# Patient Record
Sex: MALE | Race: WHITE | NOT HISPANIC OR LATINO | Employment: OTHER | URBAN - NONMETROPOLITAN AREA
[De-identification: names, ages, dates, MRNs, and addresses within clinical notes are randomized per-mention and may not be internally consistent; named-entity substitution may affect disease eponyms.]

---

## 2019-03-08 ENCOUNTER — TRANSCRIBE ORDERS (OUTPATIENT)
Dept: GENERAL RADIOLOGY | Facility: CLINIC | Age: 69
End: 2019-03-08

## 2019-03-08 DIAGNOSIS — M25.511 RIGHT SHOULDER PAIN, UNSPECIFIED CHRONICITY: Primary | ICD-10-CM

## 2019-05-16 ENCOUNTER — OFFICE VISIT (OUTPATIENT)
Dept: ORTHOPEDIC SURGERY | Facility: CLINIC | Age: 69
End: 2019-05-16

## 2019-05-16 VITALS — BODY MASS INDEX: 22.2 KG/M2 | WEIGHT: 155.1 LBS | HEIGHT: 70 IN

## 2019-05-16 DIAGNOSIS — M25.511 RIGHT SHOULDER PAIN, UNSPECIFIED CHRONICITY: Primary | ICD-10-CM

## 2019-05-16 DIAGNOSIS — M75.101 ROTATOR CUFF SYNDROME, RIGHT: ICD-10-CM

## 2019-05-16 DIAGNOSIS — F17.210 HEAVY CIGARETTE SMOKER (20-39 PER DAY): ICD-10-CM

## 2019-05-16 DIAGNOSIS — E11.9 TYPE 2 DIABETES MELLITUS WITHOUT COMPLICATION, WITHOUT LONG-TERM CURRENT USE OF INSULIN (HCC): ICD-10-CM

## 2019-05-16 DIAGNOSIS — M75.41 IMPINGEMENT SYNDROME, SHOULDER, RIGHT: ICD-10-CM

## 2019-05-16 PROCEDURE — 99204 OFFICE O/P NEW MOD 45 MIN: CPT | Performed by: ORTHOPAEDIC SURGERY

## 2019-05-16 PROCEDURE — 99406 BEHAV CHNG SMOKING 3-10 MIN: CPT | Performed by: ORTHOPAEDIC SURGERY

## 2019-05-16 NOTE — PROGRESS NOTES
Jamison Mccarthy is a 68 y.o. male   Primary provider:  Bryce Waterman APRN       Chief Complaint   Patient presents with   • Right Shoulder - Pain       HISTORY OF PRESENT ILLNESS: Patient presents today for Right Shoulder pain after slipping on ice and landing on Right Shoulder. He states that is pain is constant and rates it a 3. Bryce Waterman referred Mr. Mccarthy for PT after having X-rays performed on 3/8/19 after his fall . He has been doing PT at [facility] for the past 3 weeks.     Pain in right shoulder since march after a fall.  Has negative xrays  Has done 4 weeks of PT with HEP  Constant dull aches, some sharp pains with certain activity  Pain at night wakes him up  No numbness or tingling      Upper Extremity Issue   This is a new problem. The current episode started more than 1 month ago. The problem occurs constantly. The problem has been gradually worsening. Associated symptoms include joint swelling. The symptoms are aggravated by bending and twisting. He has tried ice, NSAIDs and acetaminophen for the symptoms. The treatment provided moderate relief.        CONCURRENT MEDICAL HISTORY:    Past Medical History:   Diagnosis Date   • Diabetes (CMS/HCC)    • Heart disease    • Hypertension        No Known Allergies      Current Outpatient Medications:   •  metFORMIN (GLUCOPHAGE) 1000 MG tablet, , Disp: , Rfl: 0    Past Surgical History:   Procedure Laterality Date   • CORONARY ARTERY BYPASS GRAFT  2007   • KNEE SURGERY Left        History reviewed. No pertinent family history.     Social History     Socioeconomic History   • Marital status:      Spouse name: Not on file   • Number of children: Not on file   • Years of education: Not on file   • Highest education level: Not on file   Tobacco Use   • Smoking status: Current Every Day Smoker     Types: Cigarettes   • Smokeless tobacco: Never Used   Substance and Sexual Activity   • Alcohol use: No     Frequency: Never   • Drug use: No     "    Review of Systems   Constitutional: Negative.    HENT: Negative.    Eyes: Negative.    Respiratory: Negative.    Cardiovascular: Negative.    Gastrointestinal: Negative.    Endocrine: Negative.    Genitourinary: Negative.    Musculoskeletal: Positive for joint swelling.   Skin: Negative.    Allergic/Immunologic: Negative.    Neurological: Negative.    Psychiatric/Behavioral: Negative.        PHYSICAL EXAMINATION:       Ht 177.8 cm (70\")   Wt 70.4 kg (155 lb 1.6 oz)   BMI 22.25 kg/m²     Physical Exam   Constitutional: He is oriented to person, place, and time. He appears well-developed and well-nourished. No distress.   Eyes: Conjunctivae are normal. Pupils are equal, round, and reactive to light.   Neck: Neck supple. No tracheal deviation present. No thyromegaly present.   Cardiovascular: Normal rate and intact distal pulses.   Pulmonary/Chest: Effort normal. He exhibits no tenderness.   Abdominal: Soft. He exhibits no distension and no mass. There is no tenderness.   Neurological: He is alert and oriented to person, place, and time.   Skin: Skin is warm. No rash noted.   Psychiatric: He has a normal mood and affect. His behavior is normal. Judgment and thought content normal.       GAIT:     [x]  Normal  []  Antalgic    Assistive device: [x]  None  []  Walker     []  Crutches  []  Cane     []  Wheelchair  []  Stretcher    Right Shoulder Exam     Tenderness   The patient is experiencing tenderness in the acromioclavicular joint and acromion.    Range of Motion   Active abduction: 140   Forward flexion: 150     Muscle Strength   Abduction: 4/5   Supraspinatus: 4/5     Tests   Dominique test: positive  Cross arm: positive  Impingement: positive    Other   Erythema: absent  Sensation: normal  Pulse: present      Left Shoulder Exam     Tenderness   The patient is experiencing no tenderness.     Range of Motion   The patient has normal left shoulder ROM.    Muscle Strength   The patient has normal left shoulder " strength.    Other   Erythema: absent  Sensation: normal  Pulse: present                 Three view right shoulder     HISTORY: Right shoulder pain after slipping on ice and landed on  right shoulder three days ago.     AP films with the humerus in internal and external rotation and  scapular Y view were obtained.     COMPARISON: None     No fracture or dislocation.  Hypertrophic change acromioclavicular joint.  No other osseous or articular abnormality.     Sternotomy.     IMPRESSION:  CONCLUSION:  No fracture or dislocation.  Hypertrophic change acromioclavicular joint.     [id number]     Electronically signed by:  Jaciel Navarro MD  3/8/2019 3:54 PM [employer]  Workstation: [employer]      ASSESSMENT:    Diagnoses and all orders for this visit:    Right shoulder pain, unspecified chronicity  -     MRI Shoulder Right Without Contrast; Future    Heavy cigarette smoker (20-39 per day)    Rotator cuff syndrome, right  -     MRI Shoulder Right Without Contrast; Future    Impingement syndrome, shoulder, right  -     MRI Shoulder Right Without Contrast; Future    Type 2 diabetes mellitus without complication, without long-term current use of insulin (CMS/[facility])    Other orders  -     metFORMIN (GLUCOPHAGE) 1000 MG tablet          PLAN    I advised Jamison of the risks of continuing to use tobacco.     During this visit, I spent 4 minutes counseling the patient regarding tobacco cessation.    He has a fall with injury to shoulder 8 weeks ago.  Has tried PT with HEP  Needs an MRI to assess for rotator cuff tear.    Continue with HEP for now    Activity modification    Patient's Body mass index is 22.25 kg/m². BMI is within normal parameters. No follow-up required..  Return for recheck for MRI results.    Ryan Henriquez MD

## 2019-05-24 ENCOUNTER — HOSPITAL ENCOUNTER (OUTPATIENT)
Dept: MRI IMAGING | Facility: HOSPITAL | Age: 69
Discharge: HOME | End: 2019-05-24
Admitting: ORTHOPAEDIC SURGERY

## 2019-05-24 DIAGNOSIS — M75.41 IMPINGEMENT SYNDROME, SHOULDER, RIGHT: ICD-10-CM

## 2019-05-24 DIAGNOSIS — M75.101 ROTATOR CUFF SYNDROME, RIGHT: ICD-10-CM

## 2019-05-24 DIAGNOSIS — M25.511 RIGHT SHOULDER PAIN, UNSPECIFIED CHRONICITY: ICD-10-CM

## 2019-05-24 PROCEDURE — 73221 MRI JOINT UPR EXTREM W/O DYE: CPT

## 2019-07-02 ENCOUNTER — OFFICE VISIT (OUTPATIENT)
Dept: ORTHOPEDIC SURGERY | Facility: CLINIC | Age: 69
End: 2019-07-02

## 2019-07-02 VITALS — BODY MASS INDEX: 22.48 KG/M2 | HEIGHT: 70 IN | WEIGHT: 157 LBS

## 2019-07-02 DIAGNOSIS — M75.41 IMPINGEMENT SYNDROME, SHOULDER, RIGHT: ICD-10-CM

## 2019-07-02 DIAGNOSIS — M75.101 ROTATOR CUFF SYNDROME, RIGHT: ICD-10-CM

## 2019-07-02 DIAGNOSIS — M25.511 RIGHT SHOULDER PAIN, UNSPECIFIED CHRONICITY: Primary | ICD-10-CM

## 2019-07-02 PROCEDURE — 99213 OFFICE O/P EST LOW 20 MIN: CPT | Performed by: ORTHOPAEDIC SURGERY

## 2019-07-02 PROCEDURE — 20610 DRAIN/INJ JOINT/BURSA W/O US: CPT | Performed by: ORTHOPAEDIC SURGERY

## 2019-07-02 RX ORDER — MELOXICAM 15 MG/1
15 TABLET ORAL DAILY
Qty: 30 TABLET | Refills: 2 | Status: SHIPPED | OUTPATIENT
Start: 2019-07-02 | End: 2020-03-19

## 2019-07-02 RX ADMIN — TRIAMCINOLONE ACETONIDE 40 MG: 40 INJECTION, SUSPENSION INTRA-ARTICULAR; INTRAMUSCULAR at 11:30

## 2019-07-02 RX ADMIN — LIDOCAINE HYDROCHLORIDE 2 ML: 10 INJECTION, SOLUTION EPIDURAL; INFILTRATION; INTRACAUDAL; PERINEURAL at 11:30

## 2019-07-02 NOTE — PROGRESS NOTES
"Jamison Mccarthy is a 68 y.o. male returns for     Chief Complaint   Patient presents with   • Right Shoulder - Follow-up, Pain   • Results       HISTORY OF PRESENT ILLNESS: f/u right shoulder pain, mri done on 5/24/2019  He continues to have pain at night.  Mostly it is a dull ache but he has occasional sharp pains.  No numbness or tingling.  He is continuing to do home exercise program.     CONCURRENT MEDICAL HISTORY:    The following portions of the patient's history were reviewed and updated as appropriate: allergies, current medications, past family history, past medical history, past social history, past surgical history and problem list.     ROS  No fevers or chills.  No chest pain or shortness of air.  No GI or  disturbances.    PHYSICAL EXAMINATION:       Ht 177.8 cm (70\")   Wt 71.2 kg (157 lb)   BMI 22.53 kg/m²     Physical Exam   Constitutional: He is oriented to person, place, and time. He appears well-developed and well-nourished.   Neurological: He is alert and oriented to person, place, and time.   Psychiatric: He has a normal mood and affect. His behavior is normal. Judgment and thought content normal.       GAIT:     []  Normal  []  Antalgic    Assistive device: [x]  None  []  Walker     []  Crutches  []  Cane     []  Wheelchair  []  Stretcher    Right Shoulder Exam     Tenderness   The patient is experiencing tenderness in the acromioclavicular joint and acromion.    Range of Motion   Active abduction: 140   Forward flexion: 150     Muscle Strength   Abduction: 4/5   Supraspinatus: 4/5     Tests   Dominique test: positive  Cross arm: positive  Impingement: positive    Other   Erythema: absent  Sensation: normal  Pulse: present                Study Result     Procedure:  MRI right shoulder.         Indication:  Right shoulder pain.   .     Technique:  Multiplanar multisequence noncontrast images right  shoulder..     Prior relevant exam:  None.     Extensive acromioclavicular joint arthrosis. A " combination of  capsular hypertrophy, prominent spurs arising from distal  clavicle and acromion as well as a loculated fluid collection  (ganglia or bursa adjacent to the acromial clavicular joint,)  causing significant underlying impingement most pronounced series  9 image 16, and series 5 image 12. Additional somewhat loculated  fluid collections more proximally in the superior aspect of the  supraspinatous, loculated bursitis. The subscapularis tendon is  intact. Partial-thickness bursal surface tear distal aspect of  the more posterior portion of the supraspinatous tendon series 5  image nine. Supraspinatous and infraspinatus tendons are  otherwise unremarkable. Intact biceps tendon. Normal glenoid  dex.     IMPRESSION:  CONCLUSION: Extensive acromioclavicular joint arthrosis, capsular  hypertrophy, exuberant osteophytes, spurs and loculated fluid  collections, ganglia or loculated bursitis cause significant  underlying impingement. Additional fluid collections are more  proximally along the supraspinatous muscle, loculated bursitis.     Partial-thickness bursal surface tear posterior aspect  supraspinatous tendon.     MRI right shoulder is otherwise unremarkable.     Electronically signed by:  Kieran Valentine MD  5/28/2019 7:00 AM CDT  Workstation: [id number]             ASSESSMENT:    Diagnoses and all orders for this visit:    Right shoulder pain, unspecified chronicity  -     Large Joint Arthrocentesis: R subacromial bursa  -     Ambulatory Referral to Physical Therapy (ROM,str,  rotator cuff exercises, teach HEP)    Rotator cuff syndrome, right  -     Large Joint Arthrocentesis: R subacromial bursa  -     Ambulatory Referral to Physical Therapy (ROM,str,  rotator cuff exercises, teach HEP)    Impingement syndrome, shoulder, right  -     Large Joint Arthrocentesis: R subacromial bursa  -     Ambulatory Referral to Physical Therapy (ROM,str,  rotator cuff exercises, teach HEP)    Other orders  -     meloxicam  (MOBIC) 15 MG tablet; Take 1 tablet by mouth Daily.      Large Joint Arthrocentesis: R subacromial bursa  Date/Time: 7/2/2019 11:30 AM  Consent given by: patient  Site marked: site marked  Timeout: Immediately prior to procedure a time out was called to verify the correct patient, procedure, equipment, support staff and site/side marked as required   Supporting Documentation  Indications: pain   Procedure Details  Location: shoulder - R subacromial bursa  Preparation: Patient was prepped and draped in the usual sterile fashion  Needle size: 22 G  Approach: posterior  Medications administered: 40 mg triamcinolone acetonide 40 MG/ML; 2 mL lidocaine PF 1% 1 %  Patient tolerance: patient tolerated the procedure well with no immediate complications            PLAN    We discussed a steroid injection into the right shoulder.  We also discussed beginning physical therapy with range of motion and strengthening, rotator cuff exercises,  And teaching home exercise program.  Patient wants to use [facility] in [place] for therapy.  He has tried anti-inflammatory medications and had irritation with his stomach.  We discussed a trial of meloxicam to see if that improves his symptoms and if it irritates his stomach.    Patient's Body mass index is 22.53 kg/m². BMI is within normal parameters. No follow-up required..      Return in about 6 weeks (around 8/13/2019) for recheck.    Ryan Henriquez MD

## 2019-07-05 RX ORDER — TRIAMCINOLONE ACETONIDE 40 MG/ML
40 INJECTION, SUSPENSION INTRA-ARTICULAR; INTRAMUSCULAR
Status: COMPLETED | OUTPATIENT
Start: 2019-07-02 | End: 2019-07-02

## 2019-07-05 RX ORDER — LIDOCAINE HYDROCHLORIDE 10 MG/ML
2 INJECTION, SOLUTION EPIDURAL; INFILTRATION; INTRACAUDAL; PERINEURAL
Status: COMPLETED | OUTPATIENT
Start: 2019-07-02 | End: 2019-07-02

## 2020-03-19 ENCOUNTER — OFFICE VISIT (OUTPATIENT)
Dept: ORTHOPEDIC SURGERY | Facility: CLINIC | Age: 70
End: 2020-03-19

## 2020-03-19 VITALS — HEIGHT: 70 IN | BODY MASS INDEX: 23.49 KG/M2 | WEIGHT: 164.1 LBS

## 2020-03-19 DIAGNOSIS — G89.29 CHRONIC RIGHT SHOULDER PAIN: ICD-10-CM

## 2020-03-19 DIAGNOSIS — M25.511 CHRONIC RIGHT SHOULDER PAIN: ICD-10-CM

## 2020-03-19 DIAGNOSIS — M75.41 IMPINGEMENT SYNDROME, SHOULDER, RIGHT: ICD-10-CM

## 2020-03-19 DIAGNOSIS — M75.101 ROTATOR CUFF SYNDROME, RIGHT: Primary | ICD-10-CM

## 2020-03-19 PROCEDURE — 99214 OFFICE O/P EST MOD 30 MIN: CPT | Performed by: NURSE PRACTITIONER

## 2020-03-19 PROCEDURE — 20610 DRAIN/INJ JOINT/BURSA W/O US: CPT | Performed by: NURSE PRACTITIONER

## 2020-03-19 RX ADMIN — LIDOCAINE HYDROCHLORIDE 2 ML: 10 INJECTION, SOLUTION INFILTRATION; PERINEURAL at 09:46

## 2020-03-19 RX ADMIN — TRIAMCINOLONE ACETONIDE 40 MG: 40 INJECTION, SUSPENSION INTRA-ARTICULAR; INTRAMUSCULAR at 09:46

## 2020-03-19 NOTE — PROGRESS NOTES
"Jamison Mccarthy is a 69 y.o. male returns for     Chief Complaint   Patient presents with   • Right Shoulder - Pain       HISTORY OF PRESENT ILLNESS: Patient presents to office for follow-up of chronic right shoulder pain.  Onset of pain occurred in March 2019 due to a fall when he slipped and fell on ice.  Patient was previously evaluated and treated per Dr. Henriquez.  His last visit was on 7/2/2019 and he was given an injection of steroid into the right shoulder at that time, which significantly improved his pain.  He was also previously treated with a course of physical therapy and prescription oral NSAIDs.  Patient reports his right shoulder pain has been gradually increasing in recent months.  He denies any new injury.  Patient has recently purchased a TENS unit and an ultrasound therapy device for his right shoulder.  Patient is requesting an evaluation for repeat injection today to help with his pain.       CONCURRENT MEDICAL HISTORY:    The following portions of the patient's history were reviewed and updated as appropriate: allergies, current medications, past family history, past medical history, past social history, past surgical history and problem list.     ROS  No fevers or chills.  No chest pain or shortness of air.  No GI or  disturbances. Right shoulder pain.     PHYSICAL EXAMINATION:       Ht 177.8 cm (70\")   Wt 74.4 kg (164 lb 1.6 oz)   BMI 23.55 kg/m²     Physical Exam   Constitutional: He is oriented to person, place, and time. Vital signs are normal. He appears well-developed and well-nourished. He is active and cooperative. He does not appear ill. No distress.   HENT:   Head: Normocephalic.   Pulmonary/Chest: Effort normal. No respiratory distress.   Abdominal: Soft. He exhibits no distension.   Musculoskeletal: He exhibits tenderness (Right shoulder). He exhibits no edema or deformity.   Neurological: He is alert and oriented to person, place, and time. GCS eye subscore is 4. GCS verbal " subscore is 5. GCS motor subscore is 6.   Skin: Skin is warm, dry and intact. Capillary refill takes less than 2 seconds. No erythema.   Psychiatric: He has a normal mood and affect. His speech is normal and behavior is normal. Judgment and thought content normal. Cognition and memory are normal.   Vitals reviewed.      GAIT:     [x]  Normal  []  Antalgic    Assistive device: [x]  None  []  Walker     []  Crutches  []  Cane     []  Wheelchair  []  Stretcher    Right Shoulder Exam     Tenderness   The patient is experiencing tenderness in the acromioclavicular joint and acromion (Diffuse).    Range of Motion   Active abduction: 120   Passive abduction: 150   Extension: 40   External rotation: 70   Forward flexion: 120   Internal rotation 90 degrees: 60     Muscle Strength   Abduction: 4/5   Supraspinatus: 4/5     Tests   Dominique test: positive  Cross arm: positive  Impingement: positive  Drop arm: negative    Other   Erythema: absent  Sensation: normal  Pulse: present    Comments:  Pain and limitations with range of motion.  Pain increases against resistance.  Empty can test positive.  Full can test positive.  Impingement sign positive.  No swelling appreciated.  No erythema.  No warmth.  No signs of infection noted.  Neurovascularly intact.      Left Shoulder Exam     Tenderness   The patient is experiencing no tenderness.     Range of Motion   The patient has normal left shoulder ROM.    Muscle Strength   The patient has normal left shoulder strength.    Tests   Dominique test: negative  Cross arm: negative  Impingement: negative  Drop arm: negative    Other   Erythema: absent  Sensation: normal  Pulse: present             Procedure:  MRI right shoulder      Indication:  Right shoulder pain.   .     Technique:  Multiplanar multisequence noncontrast images right  shoulder..     Prior relevant exam:  None.     Extensive acromioclavicular joint arthrosis. A combination of  capsular hypertrophy, prominent spurs arising  from distal  clavicle and acromion as well as a loculated fluid collection  (ganglia or bursa adjacent to the acromial clavicular joint,)  causing significant underlying impingement most pronounced series  9 image 16, and series 5 image 12. Additional somewhat loculated  fluid collections more proximally in the superior aspect of the  supraspinatous, loculated bursitis. The subscapularis tendon is  intact. Partial-thickness bursal surface tear distal aspect of  the more posterior portion of the supraspinatous tendon series 5  image nine. Supraspinatous and infraspinatus tendons are  otherwise unremarkable. Intact biceps tendon. Normal glenoid  dex.     IMPRESSION:  CONCLUSION: Extensive acromioclavicular joint arthrosis, capsular  hypertrophy, exuberant osteophytes, spurs and loculated fluid  collections, ganglia or loculated bursitis cause significant  underlying impingement. Additional fluid collections are more  proximally along the supraspinatous muscle, loculated bursitis.     Partial-thickness bursal surface tear posterior aspect  supraspinatous tendon.     MRI right shoulder is otherwise unremarkable.     Electronically signed by:  Kieran Valentine MD  5/28/2019 7:00 AM CDT  Workstation: [id number]    Three view right shoulder     HISTORY: Right shoulder pain after slipping on ice and landed on  right shoulder three days ago.     AP films with the humerus in internal and external rotation and  scapular Y view were obtained.     COMPARISON: None     No fracture or dislocation.  Hypertrophic change acromioclavicular joint.  No other osseous or articular abnormality.     Sternotomy.     IMPRESSION:  CONCLUSION:  No fracture or dislocation.  Hypertrophic change acromioclavicular joint.     [id number]     Electronically signed by:  Jaciel Navarro MD  3/8/2019 3:54 PM [facility]  Workstation: [email or web address]      ASSESSMENT:    Diagnoses and all orders for this visit:    Rotator cuff syndrome, right  -     Large Joint Arthrocentesis:  R subacromial bursa  -     Miscellaneous DME  -     Miscellaneous DME    Chronic right shoulder pain  -     Large Joint Arthrocentesis: R subacromial bursa  -     Miscellaneous DME  -     Miscellaneous DME    Impingement syndrome, shoulder, right  -     Large Joint Arthrocentesis: R subacromial bursa  -     Miscellaneous DME  -     Miscellaneous DME    Other orders  -     meloxicam (Mobic) 15 MG tablet; Take 1 tablet by mouth Daily for 30 days.      Large Joint Arthrocentesis: R subacromial bursa  Date/Time: 3/19/2020 9:46 AM  Consent given by: patient  Timeout: Immediately prior to procedure a time out was called to verify the correct patient, procedure, equipment, support staff and site/side marked as required   Supporting Documentation  Indications: pain and diagnostic evaluation   Procedure Details  Location: shoulder - R subacromial bursa  Preparation: Patient was prepped and draped in the usual sterile fashion  Needle size: 22 G  Approach: posterior  Medications administered: 2 mL lidocaine 1 %; 40 mg triamcinolone acetonide 40 MG/ML  Patient tolerance: patient tolerated the procedure well with no immediate complications      PLAN    Patient complains of progressively worsening right shoulder pain in recent months with no new injury.  He sustained an initial injury due to a fall in March 2019.  MRI performed in May 2019 was positive for extensive AC joint arthrosis, capsular hypertrophy, significant underlying impingement and a partial bursal surface tear of the supraspinatus tendon.  Patient had a significant improvement with a previous subacromial injection of steroid given in July 2019 per Dr. Henriquez.  He was also previously treated with physical therapy and prescription oral NSAIDs.  Recommend a repeat subacromial injection of steroid today for management of pain/inflammation.  Recommend rest and activity modification with avoidance of heavy lifting, pulling, tugging and repetitive motions with his right  arm/shoulder.  Recommend to continue with his home exercises for conditioning and strengthening of the right shoulder that he previously learned in physical therapy.  Recommend gradual, progressive range of motion exercises of the right arm/shoulder as tolerated and based on his pain.  Recommend restarting a prescription oral NSAID in an effort to improve his pain/inflammation in the right shoulder.  Meloxicam is prescribed today.  Patient is instructed to take the medication daily for consistent dosing.  Patient is instructed to take the medication with food to minimize any potential GI upset.  Patient is reminded not to take any additional over-the-counter oral NSAIDs, such as Ibuprofen or Aleve, while taking Meloxicam.  Patient can also take Tylenol as needed for additional pain control.  Patient reports he has also recently purchased a TENS unit and an ultrasound device for therapy on his right shoulder.  He is requesting prescription for these today to obtain reimbursement from his insurance company.  These are provided per the patient's request.  Follow-up in 6 weeks for recheck as needed for any new, worsening or persistent symptoms.  If pain/symptoms persist, consider repeat MRI of the right shoulder.  Consider referral back to physical therapy.  Consider surgical consult for his significant impingement, although all elective surgeries are currently on hold for a minimum of 30 days so proceeding with continued conservative care is the best option at this point.    Return in about 4 weeks (around 4/16/2020), or if symptoms worsen or fail to improve, for Recheck.        This document has been electronically signed by MICHAEL Peng on March 23, 2020 10:51      MICHAEL Peng

## 2020-03-20 NOTE — TELEPHONE ENCOUNTER
CLOTILDE.  PATIENT  CALLED STATING A PRESCRIPTION FOR MOBIC SHOULD HAVE BEEN CALLED TO [facility], [place], AT LAST VISIT.

## 2020-03-23 RX ORDER — LIDOCAINE HYDROCHLORIDE 10 MG/ML
2 INJECTION, SOLUTION INFILTRATION; PERINEURAL
Status: COMPLETED | OUTPATIENT
Start: 2020-03-19 | End: 2020-03-19

## 2020-03-23 RX ORDER — MELOXICAM 15 MG/1
15 TABLET ORAL DAILY
Qty: 30 TABLET | Refills: 3 | Status: SHIPPED | OUTPATIENT
Start: 2020-03-23 | End: 2020-03-23 | Stop reason: SDUPTHER

## 2020-03-23 RX ORDER — TRIAMCINOLONE ACETONIDE 40 MG/ML
40 INJECTION, SUSPENSION INTRA-ARTICULAR; INTRAMUSCULAR
Status: COMPLETED | OUTPATIENT
Start: 2020-03-19 | End: 2020-03-19

## 2020-03-23 RX ORDER — MELOXICAM 15 MG/1
15 TABLET ORAL DAILY
Qty: 30 TABLET | Refills: 1 | Status: SHIPPED | OUTPATIENT
Start: 2020-03-23

## 2021-01-01 ENCOUNTER — APPOINTMENT (OUTPATIENT)
Dept: GENERAL RADIOLOGY | Facility: HOSPITAL | Age: 71
End: 2021-01-01

## 2021-01-01 ENCOUNTER — HOSPITAL ENCOUNTER (INPATIENT)
Facility: HOSPITAL | Age: 71
LOS: 1 days | End: 2021-07-07
Attending: FAMILY MEDICINE | Admitting: INTERNAL MEDICINE

## 2021-01-01 DIAGNOSIS — R07.2 PRECORDIAL PAIN: Primary | ICD-10-CM

## 2021-01-01 DIAGNOSIS — R73.9 HYPERGLYCEMIA: ICD-10-CM

## 2021-01-01 DIAGNOSIS — I95.9 HYPOTENSION, UNSPECIFIED HYPOTENSION TYPE: ICD-10-CM

## 2021-01-01 LAB
ACETONE BLD QL: ABNORMAL
ALBUMIN SERPL-MCNC: 3.7 G/DL (ref 3.5–5.2)
ALBUMIN/GLOB SERPL: 1.5 G/DL
ALP SERPL-CCNC: 100 U/L (ref 39–117)
ALT SERPL W P-5'-P-CCNC: 16 U/L (ref 1–41)
ANION GAP SERPL CALCULATED.3IONS-SCNC: 22 MMOL/L (ref 5–15)
ANION GAP SERPL CALCULATED.3IONS-SCNC: 9 MMOL/L (ref 5–15)
APTT PPP: 26.5 SECONDS (ref 20–40.3)
AST SERPL-CCNC: 19 U/L (ref 1–40)
BASOPHILS # BLD AUTO: 0.06 10*3/MM3 (ref 0–0.2)
BASOPHILS # BLD AUTO: 0.09 10*3/MM3 (ref 0–0.2)
BASOPHILS NFR BLD AUTO: 0.4 % (ref 0–1.5)
BASOPHILS NFR BLD AUTO: 0.7 % (ref 0–1.5)
BILIRUB SERPL-MCNC: 0.4 MG/DL (ref 0–1.2)
BUN SERPL-MCNC: 11 MG/DL (ref 8–23)
BUN SERPL-MCNC: 13 MG/DL (ref 8–23)
BUN/CREAT SERPL: 10 (ref 7–25)
BUN/CREAT SERPL: 11.2 (ref 7–25)
CALCIUM SPEC-SCNC: 7.8 MG/DL (ref 8.6–10.5)
CALCIUM SPEC-SCNC: 9.4 MG/DL (ref 8.6–10.5)
CHLORIDE SERPL-SCNC: 108 MMOL/L (ref 98–107)
CHLORIDE SERPL-SCNC: 98 MMOL/L (ref 98–107)
CHOLEST SERPL-MCNC: 125 MG/DL (ref 0–200)
CK SERPL-CCNC: 61 U/L (ref 20–200)
CO2 SERPL-SCNC: 17 MMOL/L (ref 22–29)
CO2 SERPL-SCNC: 21 MMOL/L (ref 22–29)
CREAT SERPL-MCNC: 1.1 MG/DL (ref 0.76–1.27)
CREAT SERPL-MCNC: 1.16 MG/DL (ref 0.76–1.27)
DEPRECATED RDW RBC AUTO: 44.6 FL (ref 37–54)
DEPRECATED RDW RBC AUTO: 46.2 FL (ref 37–54)
EOSINOPHIL # BLD AUTO: 0.05 10*3/MM3 (ref 0–0.4)
EOSINOPHIL # BLD AUTO: 0.19 10*3/MM3 (ref 0–0.4)
EOSINOPHIL NFR BLD AUTO: 0.4 % (ref 0.3–6.2)
EOSINOPHIL NFR BLD AUTO: 1.6 % (ref 0.3–6.2)
ERYTHROCYTE [DISTWIDTH] IN BLOOD BY AUTOMATED COUNT: 13.6 % (ref 12.3–15.4)
ERYTHROCYTE [DISTWIDTH] IN BLOOD BY AUTOMATED COUNT: 13.8 % (ref 12.3–15.4)
GFR SERPL CREATININE-BSD FRML MDRD: 62 ML/MIN/1.73
GFR SERPL CREATININE-BSD FRML MDRD: 66 ML/MIN/1.73
GLOBULIN UR ELPH-MCNC: 2.5 GM/DL
GLUCOSE BLDC GLUCOMTR-MCNC: 255 MG/DL (ref 70–130)
GLUCOSE BLDC GLUCOMTR-MCNC: 312 MG/DL (ref 70–130)
GLUCOSE SERPL-MCNC: 255 MG/DL (ref 65–99)
GLUCOSE SERPL-MCNC: 301 MG/DL (ref 65–99)
HCT VFR BLD AUTO: 35.7 % (ref 37.5–51)
HCT VFR BLD AUTO: 42.3 % (ref 37.5–51)
HDLC SERPL-MCNC: 36 MG/DL (ref 40–60)
HGB BLD-MCNC: 11.8 G/DL (ref 13–17.7)
HGB BLD-MCNC: 13.5 G/DL (ref 13–17.7)
HOLD SPECIMEN: NORMAL
HOLD SPECIMEN: NORMAL
IMM GRANULOCYTES # BLD AUTO: 0.05 10*3/MM3 (ref 0–0.05)
IMM GRANULOCYTES # BLD AUTO: 0.07 10*3/MM3 (ref 0–0.05)
IMM GRANULOCYTES NFR BLD AUTO: 0.4 % (ref 0–0.5)
IMM GRANULOCYTES NFR BLD AUTO: 0.5 % (ref 0–0.5)
INR PPP: 1.25 (ref 0.8–1.2)
LDLC SERPL CALC-MCNC: 73 MG/DL (ref 0–100)
LDLC/HDLC SERPL: 2.01 {RATIO}
LYMPHOCYTES # BLD AUTO: 1.27 10*3/MM3 (ref 0.7–3.1)
LYMPHOCYTES # BLD AUTO: 4.44 10*3/MM3 (ref 0.7–3.1)
LYMPHOCYTES NFR BLD AUTO: 36.4 % (ref 19.6–45.3)
LYMPHOCYTES NFR BLD AUTO: 9.2 % (ref 19.6–45.3)
MAGNESIUM SERPL-MCNC: 2.1 MG/DL (ref 1.6–2.4)
MCH RBC QN AUTO: 29.2 PG (ref 26.6–33)
MCH RBC QN AUTO: 29.6 PG (ref 26.6–33)
MCHC RBC AUTO-ENTMCNC: 31.9 G/DL (ref 31.5–35.7)
MCHC RBC AUTO-ENTMCNC: 33.1 G/DL (ref 31.5–35.7)
MCV RBC AUTO: 89.5 FL (ref 79–97)
MCV RBC AUTO: 91.4 FL (ref 79–97)
MONOCYTES # BLD AUTO: 0.46 10*3/MM3 (ref 0.1–0.9)
MONOCYTES # BLD AUTO: 0.8 10*3/MM3 (ref 0.1–0.9)
MONOCYTES NFR BLD AUTO: 3.3 % (ref 5–12)
MONOCYTES NFR BLD AUTO: 6.6 % (ref 5–12)
NEUTROPHILS NFR BLD AUTO: 11.84 10*3/MM3 (ref 1.7–7)
NEUTROPHILS NFR BLD AUTO: 54.3 % (ref 42.7–76)
NEUTROPHILS NFR BLD AUTO: 6.63 10*3/MM3 (ref 1.7–7)
NEUTROPHILS NFR BLD AUTO: 86.2 % (ref 42.7–76)
NRBC BLD AUTO-RTO: 0 /100 WBC (ref 0–0.2)
NRBC BLD AUTO-RTO: 0 /100 WBC (ref 0–0.2)
NT-PROBNP SERPL-MCNC: 526.5 PG/ML (ref 0–900)
PLATELET # BLD AUTO: 249 10*3/MM3 (ref 140–450)
PLATELET # BLD AUTO: 336 10*3/MM3 (ref 140–450)
PMV BLD AUTO: 10.9 FL (ref 6–12)
PMV BLD AUTO: 11.1 FL (ref 6–12)
POTASSIUM SERPL-SCNC: 3.3 MMOL/L (ref 3.5–5.2)
POTASSIUM SERPL-SCNC: 3.4 MMOL/L (ref 3.5–5.2)
PROT SERPL-MCNC: 6.2 G/DL (ref 6–8.5)
PROTHROMBIN TIME: 15.5 SECONDS (ref 11.1–15.3)
RBC # BLD AUTO: 3.99 10*6/MM3 (ref 4.14–5.8)
RBC # BLD AUTO: 4.63 10*6/MM3 (ref 4.14–5.8)
SODIUM SERPL-SCNC: 137 MMOL/L (ref 136–145)
SODIUM SERPL-SCNC: 138 MMOL/L (ref 136–145)
TRIGL SERPL-MCNC: 83 MG/DL (ref 0–150)
TROPONIN T SERPL-MCNC: 0.03 NG/ML (ref 0–0.03)
TROPONIN T SERPL-MCNC: 0.1 NG/ML (ref 0–0.03)
VLDLC SERPL-MCNC: 16 MG/DL (ref 5–40)
WBC # BLD AUTO: 12.2 10*3/MM3 (ref 3.4–10.8)
WBC # BLD AUTO: 13.75 10*3/MM3 (ref 3.4–10.8)
WHOLE BLOOD HOLD SPECIMEN: NORMAL

## 2021-01-01 PROCEDURE — 25010000002 DOPAMINE PER 40 MG

## 2021-01-01 PROCEDURE — 25010000002 ONDANSETRON PER 1 MG: Performed by: INTERNAL MEDICINE

## 2021-01-01 PROCEDURE — C1751 CATH, INF, PER/CENT/MIDLINE: HCPCS

## 2021-01-01 PROCEDURE — 96376 TX/PRO/DX INJ SAME DRUG ADON: CPT

## 2021-01-01 PROCEDURE — 85610 PROTHROMBIN TIME: CPT | Performed by: FAMILY MEDICINE

## 2021-01-01 PROCEDURE — 80053 COMPREHEN METABOLIC PANEL: CPT | Performed by: FAMILY MEDICINE

## 2021-01-01 PROCEDURE — 96365 THER/PROPH/DIAG IV INF INIT: CPT

## 2021-01-01 PROCEDURE — 80061 LIPID PANEL: CPT | Performed by: INTERNAL MEDICINE

## 2021-01-01 PROCEDURE — 83880 ASSAY OF NATRIURETIC PEPTIDE: CPT

## 2021-01-01 PROCEDURE — 02HV33Z INSERTION OF INFUSION DEVICE INTO SUPERIOR VENA CAVA, PERCUTANEOUS APPROACH: ICD-10-PCS | Performed by: INTERNAL MEDICINE

## 2021-01-01 PROCEDURE — 25010000002 FUROSEMIDE PER 20 MG

## 2021-01-01 PROCEDURE — 99285 EMERGENCY DEPT VISIT HI MDM: CPT

## 2021-01-01 PROCEDURE — 96375 TX/PRO/DX INJ NEW DRUG ADDON: CPT

## 2021-01-01 PROCEDURE — 25010000002 HEPARIN (PORCINE) PER 1000 UNITS: Performed by: FAMILY MEDICINE

## 2021-01-01 PROCEDURE — 85025 COMPLETE CBC W/AUTO DIFF WBC: CPT

## 2021-01-01 PROCEDURE — 93005 ELECTROCARDIOGRAM TRACING: CPT | Performed by: FAMILY MEDICINE

## 2021-01-01 PROCEDURE — 96368 THER/DIAG CONCURRENT INF: CPT

## 2021-01-01 PROCEDURE — 96366 THER/PROPH/DIAG IV INF ADDON: CPT

## 2021-01-01 PROCEDURE — G0378 HOSPITAL OBSERVATION PER HR: HCPCS

## 2021-01-01 PROCEDURE — 85730 THROMBOPLASTIN TIME PARTIAL: CPT | Performed by: FAMILY MEDICINE

## 2021-01-01 PROCEDURE — 93010 ELECTROCARDIOGRAM REPORT: CPT | Performed by: INTERNAL MEDICINE

## 2021-01-01 PROCEDURE — 71045 X-RAY EXAM CHEST 1 VIEW: CPT

## 2021-01-01 PROCEDURE — 82550 ASSAY OF CK (CPK): CPT | Performed by: FAMILY MEDICINE

## 2021-01-01 PROCEDURE — 82962 GLUCOSE BLOOD TEST: CPT

## 2021-01-01 PROCEDURE — 82009 KETONE BODYS QUAL: CPT | Performed by: INTERNAL MEDICINE

## 2021-01-01 PROCEDURE — 99255 IP/OBS CONSLTJ NEW/EST HI 80: CPT | Performed by: INTERNAL MEDICINE

## 2021-01-01 PROCEDURE — 80048 BASIC METABOLIC PNL TOTAL CA: CPT | Performed by: INTERNAL MEDICINE

## 2021-01-01 PROCEDURE — 63710000001 INSULIN REGULAR HUMAN PER 5 UNITS: Performed by: FAMILY MEDICINE

## 2021-01-01 PROCEDURE — 84484 ASSAY OF TROPONIN QUANT: CPT | Performed by: FAMILY MEDICINE

## 2021-01-01 PROCEDURE — 25010000003 INSULIN REGULAR HUMAN PER 5 UNITS: Performed by: INTERNAL MEDICINE

## 2021-01-01 PROCEDURE — 85025 COMPLETE CBC W/AUTO DIFF WBC: CPT | Performed by: FAMILY MEDICINE

## 2021-01-01 PROCEDURE — 83735 ASSAY OF MAGNESIUM: CPT | Performed by: FAMILY MEDICINE

## 2021-01-01 PROCEDURE — 25010000002 DOPAMINE PER 40 MG: Performed by: INTERNAL MEDICINE

## 2021-01-01 PROCEDURE — 25010000003 MILRINONE LACTATE IN DEXTROSE 20-5 MG/100ML-% SOLUTION: Performed by: INTERNAL MEDICINE

## 2021-01-01 RX ORDER — PANTOPRAZOLE SODIUM 40 MG/10ML
40 INJECTION, POWDER, LYOPHILIZED, FOR SOLUTION INTRAVENOUS
Status: DISCONTINUED | OUTPATIENT
Start: 2021-07-07 | End: 2021-07-07 | Stop reason: HOSPADM

## 2021-01-01 RX ORDER — SODIUM CHLORIDE 0.9 % (FLUSH) 0.9 %
10 SYRINGE (ML) INJECTION AS NEEDED
Status: DISCONTINUED | OUTPATIENT
Start: 2021-01-01 | End: 2021-07-07 | Stop reason: HOSPADM

## 2021-01-01 RX ORDER — SODIUM CHLORIDE 0.9 % (FLUSH) 0.9 %
10 SYRINGE (ML) INJECTION EVERY 12 HOURS SCHEDULED
Status: DISCONTINUED | OUTPATIENT
Start: 2021-01-01 | End: 2021-07-07 | Stop reason: HOSPADM

## 2021-01-01 RX ORDER — HEPARIN SODIUM 5000 [USP'U]/ML
4000 INJECTION, SOLUTION INTRAVENOUS; SUBCUTANEOUS ONCE
Status: COMPLETED | OUTPATIENT
Start: 2021-01-01 | End: 2021-01-01

## 2021-01-01 RX ORDER — POTASSIUM CHLORIDE 1.5 G/1.77G
40 POWDER, FOR SOLUTION ORAL AS NEEDED
Status: DISCONTINUED | OUTPATIENT
Start: 2021-01-01 | End: 2021-07-07 | Stop reason: HOSPADM

## 2021-01-01 RX ORDER — ACETAMINOPHEN 325 MG/1
650 TABLET ORAL EVERY 4 HOURS PRN
Status: DISCONTINUED | OUTPATIENT
Start: 2021-01-01 | End: 2021-07-07 | Stop reason: HOSPADM

## 2021-01-01 RX ORDER — SODIUM CHLORIDE 9 MG/ML
INJECTION, SOLUTION INTRAVENOUS
Status: COMPLETED
Start: 2021-01-01 | End: 2021-01-01

## 2021-01-01 RX ORDER — ONDANSETRON 2 MG/ML
4 INJECTION INTRAMUSCULAR; INTRAVENOUS EVERY 6 HOURS PRN
Status: DISCONTINUED | OUTPATIENT
Start: 2021-01-01 | End: 2021-07-07 | Stop reason: HOSPADM

## 2021-01-01 RX ORDER — ASPIRIN 81 MG/1
81 TABLET ORAL DAILY
Status: DISCONTINUED | OUTPATIENT
Start: 2021-07-07 | End: 2021-07-07 | Stop reason: HOSPADM

## 2021-01-01 RX ORDER — HEPARIN SODIUM 10000 [USP'U]/100ML
12 INJECTION, SOLUTION INTRAVENOUS
Status: DISCONTINUED | OUTPATIENT
Start: 2021-01-01 | End: 2021-07-07 | Stop reason: HOSPADM

## 2021-01-01 RX ORDER — HEPARIN SODIUM 5000 [USP'U]/ML
4000 INJECTION, SOLUTION INTRAVENOUS; SUBCUTANEOUS AS NEEDED
Status: DISCONTINUED | OUTPATIENT
Start: 2021-01-01 | End: 2021-07-07 | Stop reason: HOSPADM

## 2021-01-01 RX ORDER — MILRINONE LACTATE 0.2 MG/ML
0.12 INJECTION, SOLUTION INTRAVENOUS CONTINUOUS
Status: DISCONTINUED | OUTPATIENT
Start: 2021-01-01 | End: 2021-07-07 | Stop reason: HOSPADM

## 2021-01-01 RX ORDER — ACETAMINOPHEN 650 MG/1
650 SUPPOSITORY RECTAL EVERY 4 HOURS PRN
Status: DISCONTINUED | OUTPATIENT
Start: 2021-01-01 | End: 2021-07-07 | Stop reason: HOSPADM

## 2021-01-01 RX ORDER — DOBUTAMINE HYDROCHLORIDE 100 MG/100ML
2-20 INJECTION INTRAVENOUS
Status: DISCONTINUED | OUTPATIENT
Start: 2021-01-01 | End: 2021-07-07 | Stop reason: HOSPADM

## 2021-01-01 RX ORDER — SODIUM CHLORIDE 0.9 % (FLUSH) 0.9 %
30 SYRINGE (ML) INJECTION ONCE AS NEEDED
Status: DISCONTINUED | OUTPATIENT
Start: 2021-01-01 | End: 2021-07-07 | Stop reason: HOSPADM

## 2021-01-01 RX ORDER — HEPARIN SODIUM 5000 [USP'U]/ML
30 INJECTION, SOLUTION INTRAVENOUS; SUBCUTANEOUS AS NEEDED
Status: DISCONTINUED | OUTPATIENT
Start: 2021-01-01 | End: 2021-07-07 | Stop reason: HOSPADM

## 2021-01-01 RX ORDER — DOPAMINE HYDROCHLORIDE 160 MG/100ML
2-20 INJECTION, SOLUTION INTRAVENOUS
Status: DISCONTINUED | OUTPATIENT
Start: 2021-01-01 | End: 2021-07-07 | Stop reason: HOSPADM

## 2021-01-01 RX ORDER — FUROSEMIDE 10 MG/ML
40 INJECTION INTRAMUSCULAR; INTRAVENOUS ONCE
Status: COMPLETED | OUTPATIENT
Start: 2021-01-01 | End: 2021-01-01

## 2021-01-01 RX ORDER — FUROSEMIDE 10 MG/ML
INJECTION INTRAMUSCULAR; INTRAVENOUS
Status: COMPLETED
Start: 2021-01-01 | End: 2021-01-01

## 2021-01-01 RX ORDER — IPRATROPIUM BROMIDE AND ALBUTEROL SULFATE 2.5; .5 MG/3ML; MG/3ML
3 SOLUTION RESPIRATORY (INHALATION) EVERY 6 HOURS PRN
Status: DISCONTINUED | OUTPATIENT
Start: 2021-01-01 | End: 2021-07-07 | Stop reason: HOSPADM

## 2021-01-01 RX ORDER — SODIUM CHLORIDE 9 MG/ML
100 INJECTION, SOLUTION INTRAVENOUS CONTINUOUS
Status: DISCONTINUED | OUTPATIENT
Start: 2021-01-01 | End: 2021-07-07 | Stop reason: HOSPADM

## 2021-01-01 RX ORDER — ONDANSETRON 4 MG/1
4 TABLET, FILM COATED ORAL EVERY 6 HOURS PRN
Status: DISCONTINUED | OUTPATIENT
Start: 2021-01-01 | End: 2021-07-07 | Stop reason: HOSPADM

## 2021-01-01 RX ORDER — ATORVASTATIN CALCIUM 40 MG/1
40 TABLET, FILM COATED ORAL NIGHTLY
Status: DISCONTINUED | OUTPATIENT
Start: 2021-07-07 | End: 2021-07-07 | Stop reason: HOSPADM

## 2021-01-01 RX ORDER — NOREPINEPHRINE BIT/0.9 % NACL 8 MG/250ML
.02-.3 INFUSION BOTTLE (ML) INTRAVENOUS
Status: DISCONTINUED | OUTPATIENT
Start: 2021-01-01 | End: 2021-07-07 | Stop reason: HOSPADM

## 2021-01-01 RX ORDER — MILRINONE LACTATE 0.2 MG/ML
.25-.75 INJECTION, SOLUTION INTRAVENOUS
Status: DISCONTINUED | OUTPATIENT
Start: 2021-01-01 | End: 2021-01-01

## 2021-01-01 RX ORDER — DEXTROSE MONOHYDRATE 25 G/50ML
25-50 INJECTION, SOLUTION INTRAVENOUS
Status: DISCONTINUED | OUTPATIENT
Start: 2021-01-01 | End: 2021-07-07 | Stop reason: HOSPADM

## 2021-01-01 RX ORDER — DOPAMINE HYDROCHLORIDE 160 MG/100ML
INJECTION, SOLUTION INTRAVENOUS
Status: COMPLETED
Start: 2021-01-01 | End: 2021-01-01

## 2021-01-01 RX ORDER — POTASSIUM CHLORIDE 750 MG/1
40 CAPSULE, EXTENDED RELEASE ORAL AS NEEDED
Status: DISCONTINUED | OUTPATIENT
Start: 2021-01-01 | End: 2021-07-07 | Stop reason: HOSPADM

## 2021-01-01 RX ORDER — POTASSIUM CHLORIDE 7.45 MG/ML
10 INJECTION INTRAVENOUS
Status: DISCONTINUED | OUTPATIENT
Start: 2021-01-01 | End: 2021-07-07 | Stop reason: HOSPADM

## 2021-01-01 RX ORDER — DOPAMINE HYDROCHLORIDE 160 MG/100ML
2-20 INJECTION, SOLUTION INTRAVENOUS
Status: DISCONTINUED | OUTPATIENT
Start: 2021-01-01 | End: 2021-01-01

## 2021-01-01 RX ORDER — SODIUM CHLORIDE 9 MG/ML
30 INJECTION, SOLUTION INTRAVENOUS CONTINUOUS PRN
Status: DISCONTINUED | OUTPATIENT
Start: 2021-01-01 | End: 2021-07-07 | Stop reason: HOSPADM

## 2021-01-01 RX ORDER — ACETAMINOPHEN 160 MG/5ML
650 SOLUTION ORAL EVERY 4 HOURS PRN
Status: DISCONTINUED | OUTPATIENT
Start: 2021-01-01 | End: 2021-01-01 | Stop reason: SDUPTHER

## 2021-01-01 RX ADMIN — HUMAN INSULIN 4 UNITS: 100 INJECTION, SOLUTION SUBCUTANEOUS at 18:55

## 2021-01-01 RX ADMIN — HEPARIN SODIUM 4000 UNITS: 5000 INJECTION INTRAVENOUS; SUBCUTANEOUS at 20:30

## 2021-01-01 RX ADMIN — Medication 0.02 MCG/KG/MIN: at 19:45

## 2021-01-01 RX ADMIN — FUROSEMIDE 40 MG: 10 INJECTION, SOLUTION INTRAMUSCULAR; INTRAVENOUS at 20:51

## 2021-01-01 RX ADMIN — FUROSEMIDE 40 MG: 10 INJECTION INTRAMUSCULAR; INTRAVENOUS at 20:11

## 2021-01-01 RX ADMIN — SODIUM CHLORIDE 5 UNITS/HR: 9 INJECTION, SOLUTION INTRAVENOUS at 21:44

## 2021-01-01 RX ADMIN — MILRINONE LACTATE IN DEXTROSE 0.12 MCG/KG/MIN: 200 INJECTION, SOLUTION INTRAVENOUS at 21:22

## 2021-01-01 RX ADMIN — FUROSEMIDE 40 MG: 10 INJECTION INTRAMUSCULAR; INTRAVENOUS at 20:51

## 2021-01-01 RX ADMIN — SODIUM CHLORIDE 1000 ML: 900 INJECTION, SOLUTION INTRAVENOUS at 18:14

## 2021-01-01 RX ADMIN — ONDANSETRON 4 MG: 2 INJECTION INTRAMUSCULAR; INTRAVENOUS at 21:39

## 2021-01-01 RX ADMIN — DOPAMINE HYDROCHLORIDE 5 MCG/KG/MIN: 160 INJECTION, SOLUTION INTRAVENOUS at 19:40

## 2021-01-01 RX ADMIN — FUROSEMIDE 40 MG: 10 INJECTION, SOLUTION INTRAMUSCULAR; INTRAVENOUS at 20:11

## 2021-01-01 RX ADMIN — NITROGLYCERIN 1 INCH: 20 OINTMENT TOPICAL at 18:02

## 2021-01-01 RX ADMIN — SODIUM CHLORIDE 1000 ML: 900 INJECTION, SOLUTION INTRAVENOUS at 18:55

## 2021-01-01 RX ADMIN — HEPARIN SODIUM 12 UNITS/KG/HR: 10000 INJECTION, SOLUTION INTRAVENOUS at 20:31

## 2021-07-06 PROBLEM — R07.2 PRECORDIAL PAIN: Status: ACTIVE | Noted: 2021-01-01

## 2021-07-06 NOTE — ED PROVIDER NOTES
Subjective   Patient presents to the emergency department with chest pain x2 weeks that increased today while he was working outside.  He has a history of myocardial infarction x2 with CABG done in 2005 and then a stent placed 2 years later in 2007.  Patient continues to smoke.  He admits that he quit taking all of his meds over a year ago.  In addition to his hypertension, hypercholesterolemia, patient is an insulin-dependent diabetic.00      Chest Pain  Pain location:  L chest  Pain quality: aching    Pain radiates to:  Neck and L arm  Pain severity:  Moderate  Duration:  1 day  Timing:  Constant  Progression:  Worsening  Chronicity:  Recurrent  Relieved by:  Nothing  Worsened by:  Exertion  Associated symptoms: dizziness, fatigue, nausea, vomiting and weakness    Associated symptoms: no abdominal pain, no cough, no diaphoresis, no dysphagia, no fever, no headache and no shortness of breath    Risk factors: coronary artery disease, diabetes mellitus, high cholesterol, hypertension, male sex and smoking    Heat Exposure  Associated symptoms: chest pain, fatigue, nausea and vomiting    Associated symptoms: no abdominal pain, no congestion, no cough, no diarrhea, no ear pain, no fever, no headaches, no myalgias, no rash, no rhinorrhea, no shortness of breath, no sore throat and no wheezing    Vomiting  The primary symptoms include fatigue, nausea and vomiting. Primary symptoms do not include fever, abdominal pain, diarrhea, dysuria, myalgias or rash.   The illness does not include chills.       Review of Systems   Constitutional: Positive for activity change and fatigue. Negative for appetite change, chills, diaphoresis and fever.   HENT: Negative for congestion, ear discharge, ear pain, nosebleeds, rhinorrhea, sinus pressure, sore throat and trouble swallowing.    Eyes: Negative for discharge and redness.   Respiratory: Negative for apnea, cough, chest tightness, shortness of breath and wheezing.    Cardiovascular:  Positive for chest pain.   Gastrointestinal: Positive for nausea and vomiting. Negative for abdominal pain and diarrhea.   Endocrine: Negative for polyuria.   Genitourinary: Negative for dysuria, frequency and urgency.   Musculoskeletal: Negative for myalgias and neck pain.   Skin: Negative for color change and rash.   Allergic/Immunologic: Negative for immunocompromised state.   Neurological: Positive for dizziness and weakness. Negative for seizures, syncope, light-headedness and headaches.   Hematological: Negative for adenopathy. Does not bruise/bleed easily.   Psychiatric/Behavioral: Negative for behavioral problems and confusion.   All other systems reviewed and are negative.      Past Medical History:   Diagnosis Date   • Diabetes (CMS/HCC)    • Heart disease    • Hypertension        No Known Allergies    Past Surgical History:   Procedure Laterality Date   • CORONARY ARTERY BYPASS GRAFT  2007   • KNEE SURGERY Left        History reviewed. No pertinent family history.    Social History     Socioeconomic History   • Marital status:      Spouse name: Not on file   • Number of children: Not on file   • Years of education: Not on file   • Highest education level: Not on file   Tobacco Use   • Smoking status: Current Every Day Smoker     Types: Cigarettes   • Smokeless tobacco: Never Used   Substance and Sexual Activity   • Alcohol use: No   • Drug use: No           Objective   Physical Exam  Vitals and nursing note reviewed.   Constitutional:       Appearance: He is well-developed.   HENT:      Head: Normocephalic and atraumatic.      Nose: Nose normal.   Eyes:      General: No scleral icterus.        Right eye: No discharge.         Left eye: No discharge.      Conjunctiva/sclera: Conjunctivae normal.      Pupils: Pupils are equal, round, and reactive to light.   Neck:      Trachea: No tracheal deviation.   Cardiovascular:      Rate and Rhythm: Normal rate and regular rhythm.      Heart sounds: Normal  heart sounds. No murmur heard.     Pulmonary:      Effort: Pulmonary effort is normal. No respiratory distress.      Breath sounds: Normal breath sounds. No stridor. No wheezing or rales.   Abdominal:      General: Bowel sounds are normal. There is no distension.      Palpations: Abdomen is soft. There is no mass.      Tenderness: There is no abdominal tenderness. There is no guarding or rebound.   Musculoskeletal:      Cervical back: Normal range of motion and neck supple.   Skin:     General: Skin is warm and dry.      Findings: No erythema or rash.   Neurological:      Mental Status: He is alert and oriented to person, place, and time.      Coordination: Coordination normal.   Psychiatric:         Behavior: Behavior normal.         Thought Content: Thought content normal.         ECG 12 Lead      Date/Time: 7/6/2021 5:55 PM  Performed by: Rayshawn Badillo MD  Authorized by: Rayshawn Badillo MD   Interpreted by physician  Rhythm: sinus rhythm  BPM: 95  ST Elevation: V3, V4, V5, V6 and aVR  ST Depression: I and II                   ED Course  ED Course as of Jul 06 1903 Tue Jul 06, 2021   3246 EKG findings were discussed with Dr. Walker, who does not feel like patient is currently having an acute myocardial injury.    [CB]      ED Course User Index  [CB] Rayshawn Badillo MD                                   Labs Reviewed   COMPREHENSIVE METABOLIC PANEL - Abnormal; Notable for the following components:       Result Value    Glucose 301 (*)     Potassium 3.4 (*)     CO2 17.0 (*)     Anion Gap 22.0 (*)     All other components within normal limits    Narrative:     GFR Normal >60  Chronic Kidney Disease <60  Kidney Failure <15     CBC WITH AUTO DIFFERENTIAL - Abnormal; Notable for the following components:    WBC 12.20 (*)     Lymphocytes, Absolute 4.44 (*)     All other components within normal limits   TROPONIN (IN-HOUSE) - Normal    Narrative:     Troponin T Reference Range:  <= 0.03 ng/mL-    Negative for AMI  >0.03 ng/mL-     Abnormal for myocardial necrosis.  Clinicians would have to utilize clinical acumen, EKG, Troponin and serial changes to determine if it is an Acute Myocardial Infarction or myocardial injury due to an underlying chronic condition.       Results may be falsely decreased if patient taking Biotin.     BNP (IN-HOUSE) - Normal    Narrative:     Among patients with dyspnea, NT-proBNP is highly sensitive for the detection of acute congestive heart failure. In addition NT-proBNP of <300 pg/ml effectively rules out acute congestive heart failure with 99% negative predictive value.    Results may be falsely decreased if patient taking Biotin.     CK - Normal   MAGNESIUM - Normal   RAINBOW DRAW    Narrative:     The following orders were created for panel order [place] Draw.  Procedure                               Abnormality         Status                     ---------                               -----------         ------                     Green Top (Gel)[[id number]]                                  Final result               Lavender Top[[id number]]                                     Final result               Gold Top - SST[[id number]]                                   Final result                 Please view results for these tests on the individual orders.   TROPONIN (IN-HOUSE)   BLOOD GAS, ARTERIAL   URINALYSIS W/ MICROSCOPIC IF INDICATED (NO CULTURE)   PROTIME-INR   APTT   CBC WITH AUTO DIFFERENTIAL   CBC AND DIFFERENTIAL    Narrative:     The following orders were created for panel order CBC & Differential.  Procedure                               Abnormality         Status                     ---------                               -----------         ------                     CBC Auto Differential[[id number]]        Abnormal            Final result                 Please view results for these tests on the individual orders.   GREEN TOP   LAVENDER TOP   GOLD TOP - SST   CBC AND  DIFFERENTIAL    Narrative:     The following orders were created for panel order CBC & Differential.  Procedure                               Abnormality         Status                     ---------                               -----------         ------                     CBC Auto Differential[[id number]]                                                         Please view results for these tests on the individual orders.       XR Chest 1 View   Final Result   CONCLUSION:   Increased interstitial markings which may be chronic.   No focal airspace disease.   Coronary artery bypass.      [id number]      Electronically signed by:  Jaciel Navarro MD  7/6/2021 5:31 PM CDT   Workstation: [id number]                    [facility]    Final diagnoses:   Precordial pain   Hypotension, unspecified hypotension type   Hyperglycemia       ED Disposition  ED Disposition     ED Disposition Condition Comment    Decision to Admit  Level of Care: Stepdown [25]   Diagnosis: Precordial pain [786.51.ICD-9-CM]   Admitting Physician: MIGUEL ÁNGEL CHOWDHURY [[id number]]   Attending Physician: MIGUEL ÁNGEL CHOWDHURY [[id number]]   Certification: I Certify That Inpatient Hospital Services Are Medically Necessary For Greater Than 2 Midnights            No follow-up provider specified.       Medication List      No changes were made to your prescriptions during this visit.          Rayshawn Badillo MD  07/08/21 0889

## 2021-07-06 NOTE — ED NOTES
MD advised this may be a possible STEMI.  Charge RN made aware and this RN advised that there was no portable monitor for patient to be placed on.  Pt placed on [facility] by Francine SERRANO.       Marita Rubio RN  07/06/21 4661

## 2021-07-06 NOTE — ED NOTES
No new orders at this time pt has a bolus going.  No symptoms at this time      Rina Arndt RN  07/06/21 3319

## 2021-07-06 NOTE — ED NOTES
Tech pramod told me pt blood pressure low.   Trendelenburg pt and called for Dr. Badillo.  New blood pressure 71/51     Rina Arndt, RN  07/06/21 2770

## 2021-07-07 VITALS
WEIGHT: 170 LBS | BODY MASS INDEX: 25.18 KG/M2 | HEART RATE: 76 BPM | TEMPERATURE: 98.9 F | OXYGEN SATURATION: 71 % | DIASTOLIC BLOOD PRESSURE: 49 MMHG | RESPIRATION RATE: 20 BRPM | SYSTOLIC BLOOD PRESSURE: 72 MMHG | HEIGHT: 69 IN

## 2021-07-07 LAB
QT INTERVAL: 376 MS
QTC INTERVAL: 472 MS

## 2021-07-07 NOTE — ED NOTES
Called [facility] and spoke with alexandro and informed of time to samantha.     Belle Zacarias, RN  07/07/21 0027

## 2021-07-07 NOTE — DISCHARGE SUMMARY
[facility] Medicine Services  DEATH SUMMARY       Date of Admission: 7/6/2021  Date of Death: 7/6/2021 at approximately 2300 hrs.  Primary Care Physician: Bryce Waterman APRN    Presenting Problem/History of Present Illness:  Precordial pain [R07.2]     Final Death Diagnoses:  Active Hospital Problems    Diagnosis    • Precordial pain        Consults:   Consults     Date and Time Order Name Status Description    7/6/2021  6:54 PM Cardiology - Primary (on-call MD unless specified) Completed           Procedures Performed: None.                Pertinent Test Results:   Lab Results (last 7 days)     Procedure Component Value Units Date/Time    POC Glucose Once [[id number]]  (Abnormal) Collected: 07/06/21 2138    Specimen: Blood Updated: 07/06/21 2303     Glucose 312 mg/dL      Comment: RN NotifiedOperator: [id number] FERNANDO LOPEZINMeter ID: [id number]       POC Glucose Once [[id number]]  (Abnormal) Collected: 07/06/21 2103    Specimen: Blood Updated: 07/06/21 2115     Glucose 255 mg/dL      Comment: RN NotifiedOperator: [id number] CHELY CHISHOLMMeter ID: [id number]       Troponin [[id number]]  (Abnormal) Collected: 07/06/21 1934    Specimen: Blood Updated: 07/06/21 2032     Troponin T 0.101 ng/mL     Narrative:      Troponin T Reference Range:  <= 0.03 ng/mL-   Negative for AMI  >0.03 ng/mL-     Abnormal for myocardial necrosis.  Clinicians would have to utilize clinical acumen, EKG, Troponin and serial changes to determine if it is an Acute Myocardial Infarction or myocardial injury due to an underlying chronic condition.       Results may be falsely decreased if patient taking Biotin.      Basic Metabolic Panel [[id number]]  (Abnormal) Collected: 07/06/21 1934    Specimen: Blood Updated: 07/06/21 2029     Glucose 255 mg/dL      BUN 13 mg/dL      Creatinine 1.16 mg/dL      Sodium 138 mmol/L      Potassium 3.3 mmol/L      Chloride 108 mmol/L      CO2 21.0 mmol/L      Calcium  7.8 mg/dL      eGFR Non African Amer 62 mL/min/1.73      BUN/Creatinine Ratio 11.2     Anion Gap 9.0 mmol/L     Narrative:      GFR Normal >60  Chronic Kidney Disease <60  Kidney Failure <15      aPTT [[id number]]  (Normal) Collected: 07/06/21 1946    Specimen: Blood Updated: 07/06/21 2013     PTT 26.5 seconds     Narrative:      The recommended Heparin therapeutic range is 68-97 seconds.    Protime-INR [[id number]]  (Abnormal) Collected: 07/06/21 1946    Specimen: Blood Updated: 07/06/21 2013     Protime 15.5 Seconds      INR 1.25    Narrative:      Therapeutic range for most indications is 2.0-3.0 INR,  or 2.5-3.5 for mechanical heart valves.    Lipid Panel [[id number]]  (Abnormal) Collected: 07/06/21 1934    Specimen: Blood Updated: 07/06/21 2009     Total Cholesterol 125 mg/dL      Triglycerides 83 mg/dL      HDL Cholesterol 36 mg/dL      LDL Cholesterol  73 mg/dL      VLDL Cholesterol 16 mg/dL      LDL/HDL Ratio 2.01    Narrative:      Cholesterol Reference Ranges  (U.S. Department of Health and Human Services ATP III Classifications)    Desirable          <200 mg/dL  Borderline High    200-239 mg/dL  High Risk          >240 mg/dL      Triglyceride Reference Ranges  (U.S. Department of Health and Human Services ATP III Classifications)    Normal           <150 mg/dL  Borderline High  150-199 mg/dL  High             200-499 mg/dL  Very High        >500 mg/dL    HDL Reference Ranges  (U.S. Department of Health and Human Services ATP III Classifcations)    Low     <40 mg/dl (major risk factor for CHD)  High    >60 mg/dl ('negative' risk factor for CHD)        LDL Reference Ranges  (U.S. Department of Health and Human Services ATP III Classifcations)    Optimal          <100 mg/dL  Near Optimal     100-129 mg/dL  Borderline High  130-159 mg/dL  High             160-189 mg/dL  Very High        >189 mg/dL    Acetone [[id number]]  (Abnormal) Collected: 07/06/21 1934    Specimen: Blood Updated: 07/06/21 1956     Acetone  Moderate    CBC & Differential [[id number]]  (Abnormal) Collected: 07/06/21 1934    Specimen: Blood Updated: 07/06/21 1949    Narrative:      The following orders were created for panel order CBC & Differential.  Procedure                               Abnormality         Status                     ---------                               -----------         ------                     CBC Auto Differential[[id number]]        Abnormal            Final result                 Please view results for these tests on the individual orders.    CBC Auto Differential [[id number]]  (Abnormal) Collected: 07/06/21 1934    Specimen: Blood Updated: 07/06/21 1949     WBC 13.75 10*3/mm3      RBC 3.99 10*6/mm3      Hemoglobin 11.8 g/dL      Hematocrit 35.7 %      MCV 89.5 fL      MCH 29.6 pg      MCHC 33.1 g/dL      RDW 13.6 %      RDW-SD 44.6 fl      MPV 10.9 fL      Platelets 249 10*3/mm3      Neutrophil % 86.2 %      Lymphocyte % 9.2 %      Monocyte % 3.3 %      Eosinophil % 0.4 %      Basophil % 0.4 %      Immature Grans % 0.5 %      Neutrophils, Absolute 11.84 10*3/mm3      Lymphocytes, Absolute 1.27 10*3/mm3      Monocytes, Absolute 0.46 10*3/mm3      Eosinophils, Absolute 0.05 10*3/mm3      Basophils, Absolute 0.06 10*3/mm3      Immature Grans, Absolute 0.07 10*3/mm3      nRBC 0.0 /100 WBC     [place] Draw [[id number]] Collected: 07/06/21 1648    Specimen: Blood Updated: 07/06/21 1800    Narrative:      The following orders were created for panel order [place] Draw.  Procedure                               Abnormality         Status                     ---------                               -----------         ------                     Green Top (Gel)[[id number]]                                  Final result               Lavender Top[[id number]]                                     Final result               Gold Top - SST[[id number]]                                   Final result                 Please view results for these tests  on the individual orders.    Green Top (Gel) [[id number]] Collected: 07/06/21 1648    Specimen: Blood Updated: 07/06/21 1800     Extra Tube Hold for add-ons.     Comment: Auto resulted.       Lavender Top [[id number]] Collected: 07/06/21 1648    Specimen: Blood Updated: 07/06/21 1800     Extra Tube hold for add-on     Comment: Auto resulted       Gold Top - SST [[id number]] Collected: 07/06/21 1648    Specimen: Blood Updated: 07/06/21 1800     Extra Tube Hold for add-ons.     Comment: Auto resulted.       CK [[id number]]  (Normal) Collected: 07/06/21 1648    Specimen: Blood Updated: 07/06/21 1745     Creatine Kinase 61 U/L     Magnesium [[id number]]  (Normal) Collected: 07/06/21 1648    Specimen: Blood Updated: 07/06/21 1745     Magnesium 2.1 mg/dL     Comprehensive Metabolic Panel [[id number]]  (Abnormal) Collected: 07/06/21 1648    Specimen: Blood Updated: 07/06/21 1726     Glucose 301 mg/dL      BUN 11 mg/dL      Creatinine 1.10 mg/dL      Sodium 137 mmol/L      Potassium 3.4 mmol/L      Comment: Slight hemolysis detected by analyzer. Results may be affected.        Chloride 98 mmol/L      CO2 17.0 mmol/L      Calcium 9.4 mg/dL      Total Protein 6.2 g/dL      Albumin 3.70 g/dL      ALT (SGPT) 16 U/L      AST (SGOT) 19 U/L      Alkaline Phosphatase 100 U/L      Total Bilirubin 0.4 mg/dL      eGFR Non African Amer 66 mL/min/1.73      Globulin 2.5 gm/dL      A/G Ratio 1.5 g/dL      BUN/Creatinine Ratio 10.0     Anion Gap 22.0 mmol/L     Narrative:      GFR Normal >60  Chronic Kidney Disease <60  Kidney Failure <15      Troponin [[id number]]  (Normal) Collected: 07/06/21 1648    Specimen: Blood Updated: 07/06/21 1724     Troponin T 0.026 ng/mL     Narrative:      Troponin T Reference Range:  <= 0.03 ng/mL-   Negative for AMI  >0.03 ng/mL-     Abnormal for myocardial necrosis.  Clinicians would have to utilize clinical acumen, EKG, Troponin and serial changes to determine if it is an Acute Myocardial Infarction or  myocardial injury due to an underlying chronic condition.       Results may be falsely decreased if patient taking Biotin.      BNP [[id number]]  (Normal) Collected: 07/06/21 1648    Specimen: Blood Updated: 07/06/21 1719     proBNP 526.5 pg/mL     Narrative:      Among patients with dyspnea, NT-proBNP is highly sensitive for the detection of acute congestive heart failure. In addition NT-proBNP of <300 pg/ml effectively rules out acute congestive heart failure with 99% negative predictive value.    Results may be falsely decreased if patient taking Biotin.      CBC & Differential [[id number]]  (Abnormal) Collected: 07/06/21 1648    Specimen: Blood Updated: 07/06/21 1703    Narrative:      The following orders were created for panel order CBC & Differential.  Procedure                               Abnormality         Status                     ---------                               -----------         ------                     CBC Auto Differential[[id number]]        Abnormal            Final result                 Please view results for these tests on the individual orders.    CBC Auto Differential [[id number]]  (Abnormal) Collected: 07/06/21 1648    Specimen: Blood Updated: 07/06/21 1703     WBC 12.20 10*3/mm3      RBC 4.63 10*6/mm3      Hemoglobin 13.5 g/dL      Hematocrit 42.3 %      MCV 91.4 fL      MCH 29.2 pg      MCHC 31.9 g/dL      RDW 13.8 %      RDW-SD 46.2 fl      MPV 11.1 fL      Platelets 336 10*3/mm3      Neutrophil % 54.3 %      Lymphocyte % 36.4 %      Monocyte % 6.6 %      Eosinophil % 1.6 %      Basophil % 0.7 %      Immature Grans % 0.4 %      Neutrophils, Absolute 6.63 10*3/mm3      Lymphocytes, Absolute 4.44 10*3/mm3      Monocytes, Absolute 0.80 10*3/mm3      Eosinophils, Absolute 0.19 10*3/mm3      Basophils, Absolute 0.09 10*3/mm3      Immature Grans, Absolute 0.05 10*3/mm3      nRBC 0.0 /100 WBC         Imaging Results (Last 7 Days)     Procedure Component Value Units Date/Time    XR Chest  1 View [[id number]] Collected: 07/06/21 1923     Updated: 07/1950    Narrative:      PROCEDURE:   XR CHEST 1 VIEW  dated  7/6/2021 7:23 PM CDT    CLINICAL HISTORY:   Male 70 years of age.   central line  placement, R07.2 Precordial pain I95.9 Hypotension, unspecified  R73.9 Hyperglycemia, unspecified    TECHNIQUE: AP view of the chest obtained portably at 7:23 PM.    PREVIOUS STUDIES:  Two hours earlier    FINDINGS:      Right IJ central vascular catheter terminates in the SVC. There  are median sternotomy wires and mediastinal surgical clips are in  cardiac and mediastinal contours are normal. There is bilateral  pulmonary vascular congestion and mild interstitial edema. There  is no pleural effusion, with qualification that the costophrenic  angles are not included in field-of-view. There is no  pneumothorax.      Impression:        Tip of right IJ vascular catheter in the SVC.    Suspicious for congestive heart failure, with pulmonary vascular  congestion and interstitial edema.      Electronically signed by:  Dahlia Avelar MD  7/6/2021 7:50 PM CDT  Workstation: [id number]    XR Chest 1 View [[id number]] Collected: 07/06/21 1709     Updated: 07/06/21 1732    Narrative:        PORTABLE CHEST    HISTORY: Chest pain    Portable AP upright film of the chest was obtained at 5:11 PM.  COMPARISON: None    FINDINGS:   Increased interstitial markings which may be chronic.  No focal airspace disease.  Old granulomatous disease is present.  Coronary artery bypass.  The heart is not enlarged.  The pulmonary vasculature is not increased.  No pleural effusion.  No pneumothorax.  No acute osseous abnormality.  Degenerative changes are present in the thoracic spine.  Hypertrophic change each acromioclavicular joint.      Impression:      CONCLUSION:  Increased interstitial markings which may be chronic.  No focal airspace disease.  Coronary artery bypass.    [id number]    Electronically signed by:  Jaciel Navarro MD  7/6/2021 5:31  PM CDT  Workstation: [id number]            Hospital Course:  The patient is admitted for acute coronary syndrome complicated by cardiogenic shock/DKA and hypokalemia.  He was started on noninvasive respiratory therapy, vasopressor, IV resuscitation, insulin infusion and potassium repletion protocol.  He was seen by Dr. Walker on consult and appropriate recommendations were made.  Bedside echocardiogram showed global hypokinesis with an ejection fraction of 15%.  Patient continued to deteriorate despite a combination of dopamine, Levophed infusions and milrinone.  CODE STATUS was discussed with the patient and he elected to be 'no intubation and no CPR'.  He  about 2300 hrs and family was notified.          Tonny Neri MD  21  11:42 CDT    Time: 20 minutes.

## 2021-07-07 NOTE — SIGNIFICANT NOTE
Over the course of the evening on 7/6/2021 the patient's condition continued to deteriorate, requiring maximum doses of vasopressor therapy and inotropic therapy.  The patient's case was discussed with the cardiology team, Dr. Walker.  The patient elected to forego cardiac resuscitation and his status was changed to DNR.  Despite maximal medical therapy the patient's condition declined and he subsequently went into cardiac arrest.  The patient's at time of death was 2300 on 7/6/2021.  The presumed cause of death was cardiogenic shock.  I personally consulted with the patient's family and answered all questions regarding the patient's care.    Jewel Ingram MD

## 2021-07-07 NOTE — ED NOTES
Coby Phan from [facility] returned call and stated that family declined tissue donation. Lab made aware     Belle Zacarias RN  07/07/21 0028

## 2021-07-07 NOTE — ED NOTES
Pt rhythm on monitor noted to be asystole, no respirations, no hear audible or palpible heart tones. Dr. Ingram and Dr. Walker made aware     Belle Zacarias RN  07/06/21 7234

## 2021-07-07 NOTE — ED NOTES
Called [facility]  home and informed of patient expiring, they were made aware that we are still waiting on [facility] to decide on tissue donation     Belle Zacarias, RN  21 0017

## 2021-07-07 NOTE — H&P
[facility]  INPATIENT HISTORY AND PHYSICAL       Patient Care Team:  Bryce Waterman APRN as PCP - General (Emergency Medicine)    Chief complaint   Chief Complaint   Patient presents with   • Chest Pain   • Heat Exposure   • Vomiting       Subjective     Patient is a 70 y.o. male with history of coronary artery disease status post CABG, type 2 diabetes mellitus, nicotine dependence, medication noncompliance presents with onset of chest pain at about 4 PM today. Pain does radiate to the left upper extremity and is associated with nausea, vomiting, palpitation, diaphoresis and shortness of air.  Patient denies hematemesis, melena, hematochezia, syncope or presyncope.  Since his CABG many years ago, patient has been noncompliant, does not see any provider, has not been on any cardiac medications.    Blood work showed an initial troponin of 0.026, glucose 201, potassium 3.4, anion gap 22, WBC 12.2 and magnesium 2.1.  Chest x-ray showed chronic interstitial markings and EKG showed acute ischemic changes consistent with subendocardial injury.      Review of Systems   Constitutional: Positive for activity change, appetite change and fatigue. Negative for chills, diaphoresis and fever.   HENT: Negative for trouble swallowing and voice change.    Eyes: Negative for photophobia and visual disturbance.   Respiratory: Positive for shortness of breath. Negative for cough, choking, chest tightness, wheezing and stridor.    Cardiovascular: Positive for chest pain. Negative for palpitations and leg swelling.   Gastrointestinal: Positive for nausea and vomiting. Negative for abdominal distention, abdominal pain, blood in stool, constipation and diarrhea.   Endocrine: Negative for cold intolerance, heat intolerance, polydipsia, polyphagia and polyuria.   Genitourinary: Negative for decreased urine volume, difficulty urinating, dysuria, enuresis, flank pain, frequency,  hematuria and urgency.   Musculoskeletal: Negative for arthralgias, gait problem, myalgias, neck pain and neck stiffness.   Skin: Negative for pallor, rash and wound.   Neurological: Negative for dizziness, tremors, seizures, syncope, facial asymmetry, speech difficulty, weakness, light-headedness, numbness and headaches.   Hematological: Does not bruise/bleed easily.   Psychiatric/Behavioral: Negative for agitation, behavioral problems and confusion.         History  Past Medical History:   Diagnosis Date   • Diabetes (CMS/HCC)    • Heart disease    • Hypertension      Past Surgical History:   Procedure Laterality Date   • CORONARY ARTERY BYPASS GRAFT  2007   • KNEE SURGERY Left      History reviewed. No pertinent family history.  Social History     Tobacco Use   • Smoking status: Current Every Day Smoker     Types: Cigarettes   • Smokeless tobacco: Never Used   Substance Use Topics   • Alcohol use: No   • Drug use: No     (Not in a hospital admission)    Allergies:  Patient has no known allergies.  Prior to Admission medications    Medication Sig Start Date End Date Taking? Authorizing Provider   meloxicam (Mobic) 15 MG tablet Take 1 tablet by mouth Daily. 3/23/20   Jessica Hinds APRN   metFORMIN (GLUCOPHAGE) 1000 MG tablet  5/9/19   Provider, MD Enrique       Objective        Vital Signs  Temp:  [98.9 °F (37.2 °C)] 98.9 °F (37.2 °C)  Heart Rate:  [] 90  Resp:  [18-20] 18  BP: (54-90)/(38-52) 69/51      Physical Exam  Vitals and nursing note reviewed.   Constitutional:       General: He is not in acute distress.     Appearance: He is well-developed. He is ill-appearing. He is not diaphoretic.   HENT:      Head: Normocephalic and atraumatic.   Eyes:      General: No scleral icterus.     Extraocular Movements: Extraocular movements intact.      Pupils: Pupils are equal, round, and reactive to light.   Neck:      Thyroid: No thyromegaly.      Vascular: No JVD.   Cardiovascular:      Rate and Rhythm:  Normal rate and regular rhythm.      Heart sounds: Normal heart sounds. No murmur heard.   No friction rub. No gallop.    Pulmonary:      Effort: Pulmonary effort is normal.      Breath sounds: Normal breath sounds. No wheezing or rales.   Chest:      Chest wall: No tenderness.   Abdominal:      General: Bowel sounds are normal. There is no distension.      Palpations: Abdomen is soft. There is no mass.      Tenderness: There is no abdominal tenderness. There is no right CVA tenderness, left CVA tenderness, guarding or rebound.   Musculoskeletal:         General: No swelling, tenderness or deformity.      Cervical back: Normal range of motion and neck supple.      Right lower leg: No edema.      Left lower leg: No edema.   Skin:     General: Skin is warm and dry.      Coloration: Skin is not jaundiced or pale.      Findings: No bruising, erythema, lesion or rash.   Neurological:      General: No focal deficit present.      Mental Status: He is alert and oriented to person, place, and time.      Cranial Nerves: No cranial nerve deficit.      Sensory: No sensory deficit.      Motor: No weakness or abnormal muscle tone.      Coordination: Coordination normal.      Gait: Gait normal.      Deep Tendon Reflexes: Reflexes normal.   Psychiatric:         Mood and Affect: Mood normal.         Behavior: Behavior normal.         Thought Content: Thought content normal.         Judgment: Judgment normal.           Results Review:     Results from last 7 days   Lab Units 07/06/21  1648   SODIUM mmol/L 137   POTASSIUM mmol/L 3.4*   CHLORIDE mmol/L 98   CO2 mmol/L 17.0*   BUN mg/dL 11   CREATININE mg/dL 1.10   GLUCOSE mg/dL 301*   CALCIUM mg/dL 9.4   BILIRUBIN mg/dL 0.4   ALK PHOS U/L 100   ALT (SGPT) U/L 16   AST (SGOT) U/L 19       Results from last 7 days   Lab Units 07/06/21  1648   MAGNESIUM mg/dL 2.1       Results from last 7 days   Lab Units 07/06/21  1648   WBC 10*3/mm3 12.20*   HEMOGLOBIN g/dL 13.5   HEMATOCRIT % 42.3      PLATELETS 10*3/mm3 336           Imaging Results (Last 7 Days)     Procedure Component Value Units Date/Time    XR Chest 1 View [[id number]] Collected: 07/06/21 1709     Updated: 07/06/21 1732    Narrative:        PORTABLE CHEST    HISTORY: Chest pain    Portable AP upright film of the chest was obtained at 5:11 PM.  COMPARISON: None    FINDINGS:   Increased interstitial markings which may be chronic.  No focal airspace disease.  Old granulomatous disease is present.  Coronary artery bypass.  The heart is not enlarged.  The pulmonary vasculature is not increased.  No pleural effusion.  No pneumothorax.  No acute osseous abnormality.  Degenerative changes are present in the thoracic spine.  Hypertrophic change each acromioclavicular joint.      Impression:      CONCLUSION:  Increased interstitial markings which may be chronic.  No focal airspace disease.  Coronary artery bypass.    [id number]    Electronically signed by:  Jaciel Navarro MD  7/6/2021 5:31 PM CDT  Workstation: [id number]          Assessment / Plan       Hospital Problem List:    Precordial pain    Acute coronary syndrome (complicated by cardiogenic shock): Begin IV resuscitation, heparin infusion, vasopressors, trend troponin, ECG, check echocardiogram and lipid profile.  Dr. Walker has been consulted.    Possible DKA: Patient has hyperglycemia with elevated anion gap.  ABG, urinalysis and serum ketones are pending.  He will be started on insulin infusion per protocol.     Hypokalemia: Begin potassium repletion protocol.    Nicotine dependence: Begin nicotine patch.  Nicotine cessation counseling has been discussed with the patient.    Begin GI prophylaxis.    Additional orders and treatment plan as hospital course dictates.    I confirmed that the patient's Advance Care Plan is present, code status is documented, or surrogate decision maker is listed in the patient's medical record.     I have utilized all available immediate resources to obtain, update, or  review the patient's current medications    I discussed the patient's findings and my recommendations with patient and his wife.     Tonny Neri MD  07/06/21  19:10 CDT      Dictated Utilizing Dragon Dictation

## 2021-07-07 NOTE — ED NOTES
Coby Phan from [facility] called back and patient will be pursued for possible  tissue donation. She stated they will call back after speaking with daughter and to call and give them the morgue time if taken before call back     Belle Zacarias, RN  07/06/21 1083

## 2021-07-07 NOTE — ED NOTES
Dr Ingram updated on patients condition, patient has weak thready pulse, hypotension, and agonal respirations, Dr. Ingram instructed this Rn to called Dr. Walker, Dr. Walker was updated on the patients current condition. This Rn asked Dr. Walker for another pressor to improve the patients blood pressure. Dr. Walker gave this Rn a verbal order to start Dobutamine if needed.     David So, RN  07/06/21 8524

## 2021-07-07 NOTE — ED NOTES
Spoke with Coby Phan from [facility], will call back with information about donation.     Belle Zacarias, RN  07/06/21 1878

## 2021-07-07 NOTE — ED NOTES
Per Dr. Walker start Milrinone drip at 0.125mcg/kg/min without titration. Dr. Walker to edit the order. Pharmacy notified of the edit.     Luciana Espinosa RN  07/06/21 6137

## 2021-07-07 NOTE — ED PROVIDER NOTES
Subjective   History of Present Illness    Review of Systems    Past Medical History:   Diagnosis Date   • Diabetes (CMS/HCC)    • Heart disease    • Hypertension        No Known Allergies    Past Surgical History:   Procedure Laterality Date   • CORONARY ARTERY BYPASS GRAFT  2007   • KNEE SURGERY Left        History reviewed. No pertinent family history.    Social History     Socioeconomic History   • Marital status:      Spouse name: Not on file   • Number of children: Not on file   • Years of education: Not on file   • Highest education level: Not on file   Tobacco Use   • Smoking status: Current Every Day Smoker     Types: Cigarettes   • Smokeless tobacco: Never Used   Substance and Sexual Activity   • Alcohol use: No   • Drug use: No           Objective   Physical Exam    Central Line At Bedside    Date/Time: 7/6/2021 7:34 PM  Performed by: Isidoro Farias MD  Authorized by: Rayshawn Badillo MD     Consent:     Consent obtained:  Verbal    Consent given by:  Patient    Risks discussed:  Arterial puncture, incorrect placement, pneumothorax, infection and bleeding  Universal protocol:     Imaging studies available: yes      Site/side marked: yes      Immediately prior to procedure, a time out was called: yes      Patient identity confirmed:  Verbally with patient  Pre-procedure details:     Hand hygiene: Hand hygiene performed prior to insertion      Sterile barrier technique: All elements of maximal sterile technique followed      Skin preparation:  2% chlorhexidine    Skin preparation agent: Skin preparation agent completely dried prior to procedure    Anesthesia (see MAR for exact dosages):     Anesthesia method:  Local infiltration    Local anesthetic:  Lidocaine 1% w/o epi  Procedure details:     Location:  R internal jugular    Patient position:  Flat    Procedural supplies:  Triple lumen    Catheter size:  7 Fr    Landmarks identified: yes      Ultrasound guidance: yes      Sterile ultrasound  techniques: Sterile gel and sterile probe covers were used      Successful placement: yes    Post-procedure details:     Post-procedure:  Dressing applied and line sutured    Assessment:  Blood return through all ports, no pneumothorax on x-ray, placement verified by x-ray and free fluid flow    Patient tolerance of procedure:  Tolerated well, no immediate complications  Comments:      I was requested to put a central line at shift change by Dr. Badillo.  Please see his note for full H&P.               ED Course  ED Course as of Jul 06 1933 Tue Jul 06, 2021 1756 EKG findings were discussed with Dr. Walker, who does not feel like patient is currently having an acute myocardial injury.    [CB]      ED Course User Index  [CB] Rayshawn Badillo MD                                           [facility]    Final diagnoses:   Precordial pain   Hypotension, unspecified hypotension type   Hyperglycemia       ED Disposition  ED Disposition     ED Disposition Condition Comment    Decision to Admit  Level of Care: Stepdown [25]   Diagnosis: Precordial pain [786.51.ICD-9-CM]   Admitting Physician: MIGUEL ÁNGEL CHOWDHURY [[id number]]   Attending Physician: MIGUEL ÁNGEL CHOWDHURY [[id number]]            No follow-up provider specified.       Medication List      No changes were made to your prescriptions during this visit.          Isidoro Farias MD  07/06/21 1935

## 2021-07-07 NOTE — ED NOTES
Assumed patient care at this time. Patient is currently alert and oriented times 4, patient complains of difficulty breathing, patient is currently on a nasal cannula, patient does not complain of chest pain at this time.      David So RN  07/06/21 3373

## 2021-07-07 NOTE — CONSULTS
[facility] Cardiology  HISTORY AND PHYSICAL  Jamison Mccarthy  70 y.o. male    Chief complaint -  SOB     History of Present Illness:    This is a 70-year-old gentleman with prior history of significant coronary artery disease status post CABG x3 in 2007, followed by cardiac cath with PCI in 2010, hypertension, hyperlipidemia, active tobacco use and diabetes.  Unfortunately patient has not been following with cardiology or primary care physician over the last several years.  He has been noncompliant with his medication is not taking any medications on a regular basis currently.  Patient reported over the last few days he has been having worsening shortness of breath, he was working outside this afternoon when he started having shortness of breath and chest pain, he described the pain as heaviness in character localized to left side of the chest and radiated to his left arm, he was able to make it to his house but was very weak and lethargic and he had an episode of loss of consciousness, his ex-wife checked his blood pressure but was very low so EMS was called.  On arrival of EMS his chest pain had resolved but he was feeling weak lethargic and was hypotensive.     When patient arrived to the ER, his blood pressure is in the low 90s, he had a Nitropaste placed on after which patient got significantly hypotensive in the 60s.  Initial EKG had showed sinus rhythm with diffuse subendocardial ischemia, his initial troponin was negative.  He did have anion gap metabolic acidosis.  Patient was given IV fluids almost 2 to 3 L, subsequently patient had worsening shortness of breath, his blood pressure continue to stay significantly low and he was symptomatic from it.  Patient had central line placed, subsequently started on Levophed and dopamine with the stabilization of his blood pressure.    On my visit he was in mild distress from pulmonary edema, but denying any chest pain and was alert oriented  x3.        No Known Allergies      Past Medical History:   Diagnosis Date   • Diabetes (CMS/HCC)    • Heart disease    • Hypertension          Past Surgical History:   Procedure Laterality Date   • CORONARY ARTERY BYPASS GRAFT  2007   • KNEE SURGERY Left          History reviewed. No pertinent family history.      Social History     Socioeconomic History   • Marital status:      Spouse name: Not on file   • Number of children: Not on file   • Years of education: Not on file   • Highest education level: Not on file   Tobacco Use   • Smoking status: Current Every Day Smoker     Types: Cigarettes   • Smokeless tobacco: Never Used   Substance and Sexual Activity   • Alcohol use: No   • Drug use: No         Prior to Admission medications    Medication Sig Start Date End Date Taking? Authorizing Provider   meloxicam (Mobic) 15 MG tablet Take 1 tablet by mouth Daily. 3/23/20   Jessica Hinds APRN   metFORMIN (GLUCOPHAGE) 1000 MG tablet  5/9/19   Provider, MD Enrique         Review of Systems:     Constitution: Denies any fatigue, fever or chills.  HENT: Denies any headache, hearing impairment.  Eyes: Denies any blurring of vision, or photophobia.  Cardiovascular:  As per history of present illness.   Respiratory system: Denies any COPD, shortness of breath.  Endocrine:  No history of hyperlipidemia, diabetes.  Musculoskeletal:  No history of arthritis with musculoskeletal problems.  Gastrointestinal: No nausea, vomiting, or melena.  Genitourinary: No dysuria or hematuria.  Neurological: No history of seizure disorder, stroke, or memory problems.    Psychiatric/Behavioral: No history of depression, bipolar disorder or schizophrenia .    Hematological: No history of easy bruising.    ROS          OBJECTIVE:    BP (!) 63/38   Pulse 105   Temp 98.9 °F (37.2 °C) (Oral)   Resp 18   Wt 77.1 kg (170 lb)   SpO2 92%   BMI 24.39 kg/m²       Physical Exam:   Decreased air entry with bilateral significant crackles  and expiratory wheezes  S1 plus S2, tachycardic  No obvious JVD  Extremities were warm without any evidence of edema  Patient is alert oriented x3.      Lab Results (last 24 hours)     Procedure Component Value Units Date/Time    Troponin [[id number]]  (Normal) Collected: 07/06/21 1648    Specimen: Blood Updated: 07/06/21 1724     Troponin T 0.026 ng/mL     Narrative:      Troponin T Reference Range:  <= 0.03 ng/mL-   Negative for AMI  >0.03 ng/mL-     Abnormal for myocardial necrosis.  Clinicians would have to utilize clinical acumen, EKG, Troponin and serial changes to determine if it is an Acute Myocardial Infarction or myocardial injury due to an underlying chronic condition.       Results may be falsely decreased if patient taking Biotin.      CBC & Differential [[id number]]  (Abnormal) Collected: 07/06/21 1648    Specimen: Blood Updated: 07/06/21 1703    Narrative:      The following orders were created for panel order CBC & Differential.  Procedure                               Abnormality         Status                     ---------                               -----------         ------                     CBC Auto Differential[[id number]]        Abnormal            Final result                 Please view results for these tests on the individual orders.    Comprehensive Metabolic Panel [[id number]]  (Abnormal) Collected: 07/06/21 1648    Specimen: Blood Updated: 07/06/21 1726     Glucose 301 mg/dL      BUN 11 mg/dL      Creatinine 1.10 mg/dL      Sodium 137 mmol/L      Potassium 3.4 mmol/L      Comment: Slight hemolysis detected by analyzer. Results may be affected.        Chloride 98 mmol/L      CO2 17.0 mmol/L      Calcium 9.4 mg/dL      Total Protein 6.2 g/dL      Albumin 3.70 g/dL      ALT (SGPT) 16 U/L      AST (SGOT) 19 U/L      Alkaline Phosphatase 100 U/L      Total Bilirubin 0.4 mg/dL      eGFR Non African Amer 66 mL/min/1.73      Globulin 2.5 gm/dL      A/G Ratio 1.5 g/dL      BUN/Creatinine Ratio  10.0     Anion Gap 22.0 mmol/L     Narrative:      GFR Normal >60  Chronic Kidney Disease <60  Kidney Failure <15      BNP [[id number]]  (Normal) Collected: 07/06/21 1648    Specimen: Blood Updated: 07/06/21 1719     proBNP 526.5 pg/mL     Narrative:      Among patients with dyspnea, NT-proBNP is highly sensitive for the detection of acute congestive heart failure. In addition NT-proBNP of <300 pg/ml effectively rules out acute congestive heart failure with 99% negative predictive value.    Results may be falsely decreased if patient taking Biotin.      CBC Auto Differential [[id number]]  (Abnormal) Collected: 07/06/21 1648    Specimen: Blood Updated: 07/06/21 1703     WBC 12.20 10*3/mm3      RBC 4.63 10*6/mm3      Hemoglobin 13.5 g/dL      Hematocrit 42.3 %      MCV 91.4 fL      MCH 29.2 pg      MCHC 31.9 g/dL      RDW 13.8 %      RDW-SD 46.2 fl      MPV 11.1 fL      Platelets 336 10*3/mm3      Neutrophil % 54.3 %      Lymphocyte % 36.4 %      Monocyte % 6.6 %      Eosinophil % 1.6 %      Basophil % 0.7 %      Immature Grans % 0.4 %      Neutrophils, Absolute 6.63 10*3/mm3      Lymphocytes, Absolute 4.44 10*3/mm3      Monocytes, Absolute 0.80 10*3/mm3      Eosinophils, Absolute 0.19 10*3/mm3      Basophils, Absolute 0.09 10*3/mm3      Immature Grans, Absolute 0.05 10*3/mm3      nRBC 0.0 /100 WBC     CK [[id number]]  (Normal) Collected: 07/06/21 1648    Specimen: Blood Updated: 07/06/21 1745     Creatine Kinase 61 U/L     Magnesium [[id number]]  (Normal) Collected: 07/06/21 1648    Specimen: Blood Updated: 07/06/21 1745     Magnesium 2.1 mg/dL     Troponin [[id number]]  (Abnormal) Collected: 07/06/21 1934    Specimen: Blood Updated: 07/06/21 2032     Troponin T 0.101 ng/mL     Narrative:      Troponin T Reference Range:  <= 0.03 ng/mL-   Negative for AMI  >0.03 ng/mL-     Abnormal for myocardial necrosis.  Clinicians would have to utilize clinical acumen, EKG, Troponin and serial changes to determine if it is an Acute  Myocardial Infarction or myocardial injury due to an underlying chronic condition.       Results may be falsely decreased if patient taking Biotin.      CBC & Differential [[id number]]  (Abnormal) Collected: 07/06/21 1934    Specimen: Blood Updated: 07/06/21 1949    Narrative:      The following orders were created for panel order CBC & Differential.  Procedure                               Abnormality         Status                     ---------                               -----------         ------                     CBC Auto Differential[[id number]]        Abnormal            Final result                 Please view results for these tests on the individual orders.    CBC Auto Differential [[id number]]  (Abnormal) Collected: 07/06/21 1934    Specimen: Blood Updated: 07/06/21 1949     WBC 13.75 10*3/mm3      RBC 3.99 10*6/mm3      Hemoglobin 11.8 g/dL      Hematocrit 35.7 %      MCV 89.5 fL      MCH 29.6 pg      MCHC 33.1 g/dL      RDW 13.6 %      RDW-SD 44.6 fl      MPV 10.9 fL      Platelets 249 10*3/mm3      Neutrophil % 86.2 %      Lymphocyte % 9.2 %      Monocyte % 3.3 %      Eosinophil % 0.4 %      Basophil % 0.4 %      Immature Grans % 0.5 %      Neutrophils, Absolute 11.84 10*3/mm3      Lymphocytes, Absolute 1.27 10*3/mm3      Monocytes, Absolute 0.46 10*3/mm3      Eosinophils, Absolute 0.05 10*3/mm3      Basophils, Absolute 0.06 10*3/mm3      Immature Grans, Absolute 0.07 10*3/mm3      nRBC 0.0 /100 WBC     Lipid Panel [[id number]]  (Abnormal) Collected: 07/06/21 1934    Specimen: Blood Updated: 07/06/21 2009     Total Cholesterol 125 mg/dL      Triglycerides 83 mg/dL      HDL Cholesterol 36 mg/dL      LDL Cholesterol  73 mg/dL      VLDL Cholesterol 16 mg/dL      LDL/HDL Ratio 2.01    Narrative:      Cholesterol Reference Ranges  (U.S. Department of Health and Human Services ATP III Classifications)    Desirable          <200 mg/dL  Borderline High    200-239 mg/dL  High Risk          >240  mg/dL      Triglyceride Reference Ranges  (U.S. Department of Health and Human Services ATP III Classifications)    Normal           <150 mg/dL  Borderline High  150-199 mg/dL  High             200-499 mg/dL  Very High        >500 mg/dL    HDL Reference Ranges  (U.S. Department of Health and Human Services ATP III Classifcations)    Low     <40 mg/dl (major risk factor for CHD)  High    >60 mg/dl ('negative' risk factor for CHD)        LDL Reference Ranges  (U.S. Department of Health and Human Services ATP III Classifcations)    Optimal          <100 mg/dL  Near Optimal     100-129 mg/dL  Borderline High  130-159 mg/dL  High             160-189 mg/dL  Very High        >189 mg/dL    Acetone [[id number]]  (Abnormal) Collected: 07/06/21 1934    Specimen: Blood Updated: 07/06/21 1956     Acetone Moderate    Basic Metabolic Panel [[id number]]  (Abnormal) Collected: 07/06/21 1934    Specimen: Blood Updated: 07/06/21 2029     Glucose 255 mg/dL      BUN 13 mg/dL      Creatinine 1.16 mg/dL      Sodium 138 mmol/L      Potassium 3.3 mmol/L      Chloride 108 mmol/L      CO2 21.0 mmol/L      Calcium 7.8 mg/dL      eGFR Non African Amer 62 mL/min/1.73      BUN/Creatinine Ratio 11.2     Anion Gap 9.0 mmol/L     Narrative:      GFR Normal >60  Chronic Kidney Disease <60  Kidney Failure <15      Protime-INR [[id number]]  (Abnormal) Collected: 07/06/21 1946    Specimen: Blood Updated: 07/06/21 2013     Protime 15.5 Seconds      INR 1.25    Narrative:      Therapeutic range for most indications is 2.0-3.0 INR,  or 2.5-3.5 for mechanical heart valves.    aPTT [[id number]]  (Normal) Collected: 07/06/21 1946    Specimen: Blood Updated: 07/06/21 2013     PTT 26.5 seconds     Narrative:      The recommended Heparin therapeutic range is 68-97 seconds.              The ASCVD Risk score (Karine ANTOINE Jr., et al., 2013) failed to calculate for the following reasons:    The valid systolic blood pressure range is 90 to 200 mmHg    The valid total  cholesterol range is 130 to 320 mg/dL          A/P:    1.  Cardiogenic shock:  Patient has known extensive history of coronary artery disease with prior history of CABG in 2007, followed by PCI , however unfortunately has been noncompliant for follow-ups and medications and he continues to smoke.  I performed a bedside echocardiogram which showed severely reduced LV systolic function with global hypokinesis with an EF of less than 15%, his RV was also severely dysfunctional.  His EKG also showed subendocardial ischemia but no ST elevations.  Clinical presentation consistent with cardiogenic shock from severe ischemic cardiomyopathy.  I had a long discussion with the patient and the family.  Patient wanted to be DNR, he is okay with intubation for respiratory distress.  I explained the risk, benefits, alternatives of invasive evaluation and mechanical circulatory support for his cardiogenic shock.  Unfortunately patient is not a candidate for advanced heart failure therapies to which he can be bridged. patient and family reported understanding and did not want to pursue with invasive evaluations or mechanical circulatory support at this time.  Patient will have significantly high mortality with invasive evaluation at this point.  We will continue hemodynamic support with inotropes.  He responded well to dopamine and levophed  We will add 0.125 of milrinone  Avoid further IV fluids and continue aggressive diuresis  We will start him on a heparin drip continue aspirin/statin  Holding beta-blockers in setting of cardiogenic shock.    2.  Diabetes:  Patient is noncompliant with his diabetic medications.  Potential DKA, management per primary team.  Avoid further IV fluids.    Patient's Body mass index is 24.39 kg/m². indicating that he is within normal range (BMI 18.5-24.9). No BMI management plan needed..      Jamison Mccarthy  reports that he has been smoking cigarettes. He has never used smokeless tobacco.. I have  educated him on the risk of diseases from using tobacco products such as cancer, COPD and heart disease.     I advised him to quit and he is not willing to quit.    I spent 3  minutes counseling the patient.       Thank you for the consult.  We will continue to follow.    Howie Walker MD  7/6/2021  20:53 CDT      Part of this note may be an electronic transcription/translation of spoken language to printed text using the Dragon Dictation System.

## 2021-07-08 LAB
QT INTERVAL: 352 MS
QTC INTERVAL: 456 MS